# Patient Record
Sex: FEMALE | Race: WHITE | Employment: OTHER | URBAN - METROPOLITAN AREA
[De-identification: names, ages, dates, MRNs, and addresses within clinical notes are randomized per-mention and may not be internally consistent; named-entity substitution may affect disease eponyms.]

---

## 2017-06-02 ENCOUNTER — HOSPITAL ENCOUNTER (OUTPATIENT)
Dept: MAMMOGRAPHY | Facility: HOSPITAL | Age: 77
Discharge: HOME | End: 2017-06-02
Attending: OBSTETRICS & GYNECOLOGY
Payer: MEDICARE

## 2017-06-02 DIAGNOSIS — Z12.31 ENCOUNTER FOR SCREENING MAMMOGRAM FOR MALIGNANT NEOPLASM OF BREAST: ICD-10-CM

## 2017-06-02 PROCEDURE — 77067 SCR MAMMO BI INCL CAD: CPT | Performed by: OBSTETRICS & GYNECOLOGY

## 2017-07-14 ENCOUNTER — LAB ENCOUNTER (OUTPATIENT)
Dept: LAB | Facility: HOSPITAL | Age: 77
End: 2017-07-14
Attending: INTERNAL MEDICINE
Payer: MEDICARE

## 2017-07-14 DIAGNOSIS — E55.9 VITAMIN D DEFICIENCY, UNSPECIFIED: ICD-10-CM

## 2017-07-14 DIAGNOSIS — E78.00 PURE HYPERCHOLESTEROLEMIA, UNSPECIFIED: Primary | ICD-10-CM

## 2017-07-14 DIAGNOSIS — R73.01 IMPAIRED FASTING GLUCOSE: ICD-10-CM

## 2017-07-14 LAB
25(OH)D3 SERPL-MCNC: 40.2 NG/ML
ALBUMIN SERPL BCP-MCNC: 4.2 G/DL (ref 3.5–4.8)
ALBUMIN/GLOB SERPL: 1.6 {RATIO} (ref 1–2)
ALP SERPL-CCNC: 77 U/L (ref 32–100)
ALT SERPL-CCNC: 18 U/L (ref 14–54)
ANION GAP SERPL CALC-SCNC: 8 MMOL/L (ref 0–18)
AST SERPL-CCNC: 18 U/L (ref 15–41)
BASOPHILS # BLD: 0 K/UL (ref 0–0.2)
BASOPHILS NFR BLD: 0 %
BILIRUB SERPL-MCNC: 1.8 MG/DL (ref 0.3–1.2)
BILIRUB UR QL: NEGATIVE
BUN SERPL-MCNC: 10 MG/DL (ref 8–20)
BUN/CREAT SERPL: 16.4 (ref 10–20)
CALCIUM SERPL-MCNC: 9.6 MG/DL (ref 8.5–10.5)
CHLORIDE SERPL-SCNC: 103 MMOL/L (ref 95–110)
CHOLEST SERPL-MCNC: 228 MG/DL (ref 110–200)
CO2 SERPL-SCNC: 28 MMOL/L (ref 22–32)
COLOR UR: YELLOW
CREAT SERPL-MCNC: 0.61 MG/DL (ref 0.5–1.5)
EOSINOPHIL # BLD: 0.1 K/UL (ref 0–0.7)
EOSINOPHIL NFR BLD: 1 %
ERYTHROCYTE [DISTWIDTH] IN BLOOD BY AUTOMATED COUNT: 14.9 % (ref 11–15)
GLOBULIN PLAS-MCNC: 2.7 G/DL (ref 2.5–3.7)
GLUCOSE SERPL-MCNC: 106 MG/DL (ref 70–99)
GLUCOSE UR-MCNC: NEGATIVE MG/DL
HBA1C MFR BLD: 5.7 % (ref 4–6)
HCT VFR BLD AUTO: 44.4 % (ref 35–48)
HDLC SERPL-MCNC: 56 MG/DL
HGB BLD-MCNC: 14.7 G/DL (ref 12–16)
HGB UR QL STRIP.AUTO: NEGATIVE
LDLC SERPL CALC-MCNC: 153 MG/DL (ref 0–99)
LYMPHOCYTES # BLD: 1.9 K/UL (ref 1–4)
LYMPHOCYTES NFR BLD: 32 %
MCH RBC QN AUTO: 29.5 PG (ref 27–32)
MCHC RBC AUTO-ENTMCNC: 33.2 G/DL (ref 32–37)
MCV RBC AUTO: 88.7 FL (ref 80–100)
MONOCYTES # BLD: 0.4 K/UL (ref 0–1)
MONOCYTES NFR BLD: 6 %
NEUTROPHILS # BLD AUTO: 3.6 K/UL (ref 1.8–7.7)
NEUTROPHILS NFR BLD: 60 %
NITRITE UR QL STRIP.AUTO: NEGATIVE
NONHDLC SERPL-MCNC: 172 MG/DL
OSMOLALITY UR CALC.SUM OF ELEC: 287 MOSM/KG (ref 275–295)
PH UR: 5 [PH] (ref 5–8)
PLATELET # BLD AUTO: 283 K/UL (ref 140–400)
PMV BLD AUTO: 8 FL (ref 7.4–10.3)
POTASSIUM SERPL-SCNC: 4 MMOL/L (ref 3.3–5.1)
PROT SERPL-MCNC: 6.9 G/DL (ref 5.9–8.4)
PROT UR-MCNC: NEGATIVE MG/DL
RBC # BLD AUTO: 5 M/UL (ref 3.7–5.4)
RBC #/AREA URNS AUTO: 1 /HPF
SODIUM SERPL-SCNC: 139 MMOL/L (ref 136–144)
SP GR UR STRIP: 1.01 (ref 1–1.03)
TRIGL SERPL-MCNC: 93 MG/DL (ref 1–149)
TSH SERPL-ACNC: 2.29 UIU/ML (ref 0.45–5.33)
UROBILINOGEN UR STRIP-ACNC: <2
VIT C UR-MCNC: NEGATIVE MG/DL
WBC # BLD AUTO: 5.9 K/UL (ref 4–11)
WBC #/AREA URNS AUTO: 37 /HPF

## 2017-07-14 PROCEDURE — 81001 URINALYSIS AUTO W/SCOPE: CPT

## 2017-07-14 PROCEDURE — 36415 COLL VENOUS BLD VENIPUNCTURE: CPT

## 2017-07-14 PROCEDURE — 84443 ASSAY THYROID STIM HORMONE: CPT

## 2017-07-14 PROCEDURE — 85025 COMPLETE CBC W/AUTO DIFF WBC: CPT

## 2017-07-14 PROCEDURE — 82306 VITAMIN D 25 HYDROXY: CPT

## 2017-07-14 PROCEDURE — 80053 COMPREHEN METABOLIC PANEL: CPT

## 2017-07-14 PROCEDURE — 83036 HEMOGLOBIN GLYCOSYLATED A1C: CPT

## 2017-07-14 PROCEDURE — 80061 LIPID PANEL: CPT

## 2017-07-20 ENCOUNTER — LAB REQUISITION (OUTPATIENT)
Dept: LAB | Facility: HOSPITAL | Age: 77
End: 2017-07-20
Payer: MEDICARE

## 2017-07-20 DIAGNOSIS — N39.0 URINARY TRACT INFECTION: ICD-10-CM

## 2017-07-20 PROCEDURE — 87086 URINE CULTURE/COLONY COUNT: CPT | Performed by: INTERNAL MEDICINE

## 2017-08-30 PROBLEM — R13.19 ESOPHAGEAL DYSPHAGIA: Status: ACTIVE | Noted: 2017-08-30

## 2017-08-30 PROBLEM — Z86.010 HISTORY OF COLON POLYPS: Status: ACTIVE | Noted: 2017-08-30

## 2017-08-30 PROBLEM — K57.30 DIVERTICULOSIS OF LARGE INTESTINE WITHOUT HEMORRHAGE: Status: ACTIVE | Noted: 2017-08-30

## 2017-09-14 ENCOUNTER — HOSPITAL ENCOUNTER (OUTPATIENT)
Dept: BONE DENSITY | Facility: HOSPITAL | Age: 77
Discharge: HOME | End: 2017-09-14
Attending: INTERNAL MEDICINE
Payer: MEDICARE

## 2017-09-14 DIAGNOSIS — M40.209 KYPHOSIS: ICD-10-CM

## 2017-09-14 PROCEDURE — 77080 DXA BONE DENSITY AXIAL: CPT | Performed by: INTERNAL MEDICINE

## 2017-10-03 ENCOUNTER — APPOINTMENT (OUTPATIENT)
Dept: LAB | Facility: HOSPITAL | Age: 77
End: 2017-10-03
Attending: Other
Payer: MEDICARE

## 2017-10-03 ENCOUNTER — OFFICE VISIT (OUTPATIENT)
Dept: NEUROLOGY | Facility: CLINIC | Age: 77
End: 2017-10-03

## 2017-10-03 ENCOUNTER — TELEPHONE (OUTPATIENT)
Dept: NEUROLOGY | Facility: CLINIC | Age: 77
End: 2017-10-03

## 2017-10-03 VITALS
BODY MASS INDEX: 39.81 KG/M2 | WEIGHT: 172 LBS | DIASTOLIC BLOOD PRESSURE: 74 MMHG | RESPIRATION RATE: 16 BRPM | HEIGHT: 55 IN | HEART RATE: 80 BPM | SYSTOLIC BLOOD PRESSURE: 140 MMHG

## 2017-10-03 DIAGNOSIS — R53.1 WEAKNESS: ICD-10-CM

## 2017-10-03 DIAGNOSIS — G62.9 POLYNEUROPATHY: ICD-10-CM

## 2017-10-03 DIAGNOSIS — R47.1 DYSARTHRIA: ICD-10-CM

## 2017-10-03 DIAGNOSIS — R47.1 DYSARTHRIA: Primary | ICD-10-CM

## 2017-10-03 PROCEDURE — 82607 VITAMIN B-12: CPT | Performed by: OTHER

## 2017-10-03 PROCEDURE — 99204 OFFICE O/P NEW MOD 45 MIN: CPT | Performed by: OTHER

## 2017-10-03 PROCEDURE — 86780 TREPONEMA PALLIDUM: CPT

## 2017-10-03 PROCEDURE — 83519 RIA NONANTIBODY: CPT

## 2017-10-03 PROCEDURE — 84238 ASSAY NONENDOCRINE RECEPTOR: CPT

## 2017-10-03 PROCEDURE — 82550 ASSAY OF CK (CPK): CPT

## 2017-10-03 PROCEDURE — 86618 LYME DISEASE ANTIBODY: CPT | Performed by: OTHER

## 2017-10-03 PROCEDURE — 82746 ASSAY OF FOLIC ACID SERUM: CPT | Performed by: OTHER

## 2017-10-03 PROCEDURE — 36415 COLL VENOUS BLD VENIPUNCTURE: CPT

## 2017-10-03 PROCEDURE — 85652 RBC SED RATE AUTOMATED: CPT | Performed by: OTHER

## 2017-10-03 RX ORDER — ATORVASTATIN CALCIUM 10 MG/1
1 TABLET, FILM COATED ORAL DAILY
COMMUNITY
Start: 2017-07-19

## 2017-10-03 NOTE — TELEPHONE ENCOUNTER
-[facility] - As part of our Prior Authorization Reduction program, this [employer] member's plan does not currently require a prior authorization to receive these services. Case # [id number]. Will call Pt. to inform. Pt.  Informed

## 2017-10-03 NOTE — PROGRESS NOTES
Neurology Outpatient Consult Note    Dex Barnes : 1940   Referring Physician: Dr. Emmett Bingham  HPI:     Dex Barnes is a 67year old female who is being seen in neurologic evaluation.     Patient is being seen in evaluation for speech rm eyes and her jaw. At times, her jaw feels that she has been chewing a lot of gum, and her eyes feel like they are closing particularly towards the end of the day.     She mentions that her mother had Alzheimer's disease, her granddaughter has migraines, an Smokeless tobacco: Never Used                        Alcohol use: No            Other Topics            Concern  Caffeine Concern        No  Exercise                No          ROS:   GENERAL: no fevers, no chills mild/early superimposed acute left maxillary sinus infection. 4. Nasal mucosal thickening/swelling bilaterally consistent with chronic sinusitis/allergic rhinitis      Carotid Dopplers 3/29/2016  CONCLUSION:  1.  There is minimal atheromatous plaque in the

## 2017-10-06 ENCOUNTER — HOSPITAL ENCOUNTER (OUTPATIENT)
Dept: MRI IMAGING | Facility: HOSPITAL | Age: 77
Discharge: HOME | End: 2017-10-06
Attending: Other
Payer: MEDICARE

## 2017-10-06 DIAGNOSIS — G62.9 POLYNEUROPATHY: ICD-10-CM

## 2017-10-06 DIAGNOSIS — R47.1 DYSARTHRIA: ICD-10-CM

## 2017-10-06 DIAGNOSIS — R53.1 WEAKNESS: ICD-10-CM

## 2017-10-06 PROCEDURE — 70551 MRI BRAIN STEM W/O DYE: CPT | Performed by: OTHER

## 2017-10-09 ENCOUNTER — HOSPITAL (OUTPATIENT)
Dept: OTHER | Age: 77
End: 2017-10-09
Attending: INTERNAL MEDICINE

## 2017-10-12 ENCOUNTER — LAB ENCOUNTER (OUTPATIENT)
Dept: LAB | Facility: HOSPITAL | Age: 77
End: 2017-10-12
Attending: DERMATOLOGY
Payer: MEDICARE

## 2017-10-12 DIAGNOSIS — L21.9 SEBORRHEA: ICD-10-CM

## 2017-10-12 DIAGNOSIS — L30.9 ACUTE DERMATITIS: Primary | ICD-10-CM

## 2017-10-12 PROCEDURE — 36415 COLL VENOUS BLD VENIPUNCTURE: CPT

## 2017-10-12 PROCEDURE — 80053 COMPREHEN METABOLIC PANEL: CPT

## 2017-10-12 PROCEDURE — 85025 COMPLETE CBC W/AUTO DIFF WBC: CPT

## 2017-10-12 PROCEDURE — 86235 NUCLEAR ANTIGEN ANTIBODY: CPT

## 2017-10-12 PROCEDURE — 86038 ANTINUCLEAR ANTIBODIES: CPT

## 2017-10-13 ENCOUNTER — TELEPHONE (OUTPATIENT)
Dept: NEUROLOGY | Facility: CLINIC | Age: 77
End: 2017-10-13

## 2017-10-14 ENCOUNTER — PATIENT MESSAGE (OUTPATIENT)
Dept: NEUROLOGY | Facility: CLINIC | Age: 77
End: 2017-10-14

## 2017-10-16 ENCOUNTER — OFFICE VISIT (OUTPATIENT)
Dept: NEUROLOGY | Facility: CLINIC | Age: 77
End: 2017-10-16

## 2017-10-16 ENCOUNTER — TELEPHONE (OUTPATIENT)
Dept: NEUROLOGY | Facility: CLINIC | Age: 77
End: 2017-10-16

## 2017-10-16 VITALS
BODY MASS INDEX: 39 KG/M2 | DIASTOLIC BLOOD PRESSURE: 72 MMHG | SYSTOLIC BLOOD PRESSURE: 138 MMHG | WEIGHT: 167 LBS | RESPIRATION RATE: 13 BRPM | HEART RATE: 80 BPM

## 2017-10-16 DIAGNOSIS — G89.29 CHRONIC LEFT-SIDED LOW BACK PAIN WITH LEFT-SIDED SCIATICA: ICD-10-CM

## 2017-10-16 DIAGNOSIS — M48.061 LUMBAR FORAMINAL STENOSIS: ICD-10-CM

## 2017-10-16 DIAGNOSIS — G60.9 IDIOPATHIC PERIPHERAL NEUROPATHY: ICD-10-CM

## 2017-10-16 DIAGNOSIS — M54.16 LUMBAR RADICULOPATHY: ICD-10-CM

## 2017-10-16 DIAGNOSIS — M43.16 SPONDYLOLISTHESIS OF LUMBAR REGION: ICD-10-CM

## 2017-10-16 DIAGNOSIS — M51.9 LUMBAR DISC DISEASE: ICD-10-CM

## 2017-10-16 DIAGNOSIS — G89.29 CHRONIC LEFT-SIDED THORACIC BACK PAIN: Primary | ICD-10-CM

## 2017-10-16 DIAGNOSIS — M41.125 ADOLESCENT IDIOPATHIC SCOLIOSIS OF THORACOLUMBAR REGION: ICD-10-CM

## 2017-10-16 DIAGNOSIS — M48.062 SPINAL STENOSIS OF LUMBAR REGION WITH NEUROGENIC CLAUDICATION: ICD-10-CM

## 2017-10-16 DIAGNOSIS — M54.6 CHRONIC LEFT-SIDED THORACIC BACK PAIN: Primary | ICD-10-CM

## 2017-10-16 DIAGNOSIS — M54.42 CHRONIC LEFT-SIDED LOW BACK PAIN WITH LEFT-SIDED SCIATICA: ICD-10-CM

## 2017-10-16 PROBLEM — E66.01 SEVERE OBESITY (BMI 35.0-39.9) WITH COMORBIDITY (HCC): Chronic | Status: ACTIVE | Noted: 2017-10-16

## 2017-10-16 PROCEDURE — 99205 OFFICE O/P NEW HI 60 MIN: CPT | Performed by: PHYSICAL MEDICINE & REHABILITATION

## 2017-10-16 RX ORDER — ERGOCALCIFEROL 1.25 MG/1
50000 CAPSULE ORAL
COMMUNITY
End: 2018-04-24

## 2017-10-16 RX ORDER — MULTIVITAMIN
1 TABLET ORAL AS NEEDED
COMMUNITY

## 2017-10-16 NOTE — PROGRESS NOTES
Low Back Pain H & P    Chief Complaint:  Patient presents with:  Back Pain: new right handed patient here with childhood hx of thoracic back and lower back pain and spasms with numbness and tingling in the left lower extremities.  pt also has left side rib thigh.  She has bilateral constant calf pain. · The pain at its best is 1/10. The pain at its worst is 9/10. The pain is currently  3/10. The back pain is described as a(n) tightness sensation. The foot pain is a burning pain.   · The pain is worse when Male      pancreatic ca twins   • Breast Cancer Other 28     niece    • Breast Cancer Maternal Aunt 39   • Cancer Maternal Uncle 71     pancreatic   • Cancer Maternal Cousin Male      pancreatic ca twins       Social History     Social History  Social Hist stated age in no distress. The patient is well groomed. Psychiatric:  The patient is alert and oriented x 3. The patient has a normal affect and mood. Respiratory:  No acute respiratory distress. Patient does not have a cough.     HEENT:  Extraocu LEFT hip ROM normal   RIGHT hip flexion Negative pain   LEFT hip flexion Negative pain   RIGHT hip PHILLIP test Positive for right lateral hip pain   LEFT hip PHILLIP test Negative for pain   RIGHT hip internal rotation Negative for pain   LEFT hip internal future. She will follow up in 2 months but will call after she has done 4 weeks of the PT. The total office visit face-to-face visit time was at least 60 minutes with over half of the time spent discussing patient care and treatment.     The patient u

## 2017-10-16 NOTE — TELEPHONE ENCOUNTER
Called [facility] for authorization of approval of left L5 TFESI cpt codes [id number], [id number]. Talked to Alejo Soria. who states no authorization is required. Reference # [id number] Will  inform Nursing.

## 2017-10-16 NOTE — PATIENT INSTRUCTIONS
As of October 6th 2014, the Drug Enforcement Agency [facility]) is reclassifying all hydrocodone combination medications from Schedule III to Schedule II. This includes medications such as Norco, Vicodin, Lortab, Zohydro, and Vicoprofen.     What this means for y chart.      Plan  She will start PT for her scoliosis. If the PT is not helping her or the pain is limiting her participation, then I will do a left L5 TFESI. The patient does not need any pain medications at this time.     I reviewed her EMG/NCS repo

## 2017-10-16 NOTE — TELEPHONE ENCOUNTER
Spoke to patient who states she will do physical therapy first and call back when ready to schedule injection.

## 2017-10-17 ENCOUNTER — MED REC SCAN ONLY (OUTPATIENT)
Dept: NEUROLOGY | Facility: CLINIC | Age: 77
End: 2017-10-17

## 2017-10-18 ENCOUNTER — OFFICE VISIT (OUTPATIENT)
Dept: PHYSICAL THERAPY | Age: 77
End: 2017-10-18
Attending: PHYSICAL MEDICINE & REHABILITATION
Payer: MEDICARE

## 2017-10-18 DIAGNOSIS — M54.16 LUMBAR RADICULOPATHY: ICD-10-CM

## 2017-10-18 DIAGNOSIS — M41.125 ADOLESCENT IDIOPATHIC SCOLIOSIS OF THORACOLUMBAR REGION: ICD-10-CM

## 2017-10-18 DIAGNOSIS — M51.9 LUMBAR DISC DISEASE: ICD-10-CM

## 2017-10-18 DIAGNOSIS — M48.061 LUMBAR FORAMINAL STENOSIS: ICD-10-CM

## 2017-10-18 DIAGNOSIS — G89.29 CHRONIC LEFT-SIDED LOW BACK PAIN WITH LEFT-SIDED SCIATICA: ICD-10-CM

## 2017-10-18 DIAGNOSIS — M54.42 CHRONIC LEFT-SIDED LOW BACK PAIN WITH LEFT-SIDED SCIATICA: ICD-10-CM

## 2017-10-18 DIAGNOSIS — M48.062 SPINAL STENOSIS OF LUMBAR REGION WITH NEUROGENIC CLAUDICATION: ICD-10-CM

## 2017-10-18 DIAGNOSIS — M43.16 SPONDYLOLISTHESIS OF LUMBAR REGION: ICD-10-CM

## 2017-10-18 DIAGNOSIS — G89.29 CHRONIC LEFT-SIDED THORACIC BACK PAIN: ICD-10-CM

## 2017-10-18 DIAGNOSIS — M54.6 CHRONIC LEFT-SIDED THORACIC BACK PAIN: ICD-10-CM

## 2017-10-18 PROCEDURE — 97112 NEUROMUSCULAR REEDUCATION: CPT

## 2017-10-18 PROCEDURE — 97163 PT EVAL HIGH COMPLEX 45 MIN: CPT

## 2017-10-18 NOTE — PROGRESS NOTES
LUMBAR SPINE EVALUATION:   Referring Physician: Dr. Chandana Johnston  Diagnosis: Chronic left-sided thoracic back pain (M54.6,G89.29)  Chronic left-sided low back pain with left-sided sciatica (M54.42,G89.29)  Lumbar radiculopathy (M54.16)  Spinal stenosis of lumb Latrice Gracia for speech deficit that has resulted. No significant findings per imaging. Since falls, can no longer sleep in bed due to headaches; sleeps in chair.      Patient ambulates with straight cane at all times for community ambulation; occasionally uses rib hump, L lower rib hump, R iliac crest elevated vs L. Pt minimally laterally shifted, however moderately compressed axially. R shoulder more anteriorly rotated than L which may be secondary to hx of R total shoulder in addition to scoliotic posture.  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 Pt will demo proper gait mechanics with use of SC with improved upright posture, improved foot clearance, heel to toe gait pattern          Frequency / Duration: Patient will be seen for 2x/week or a total of 10 visits over a 90 day period.   Esdras Hernandez

## 2017-10-20 ENCOUNTER — OFFICE VISIT (OUTPATIENT)
Dept: PHYSICAL THERAPY | Age: 77
End: 2017-10-20
Attending: PHYSICAL MEDICINE & REHABILITATION
Payer: MEDICARE

## 2017-10-20 PROCEDURE — 97110 THERAPEUTIC EXERCISES: CPT

## 2017-10-20 PROCEDURE — 97116 GAIT TRAINING THERAPY: CPT

## 2017-10-20 PROCEDURE — 97112 NEUROMUSCULAR REEDUCATION: CPT

## 2017-10-20 NOTE — PROGRESS NOTES
Chronic left-sided thoracic back pain (M54.6,G89.29)  Chronic left-sided low back pain with left-sided sciatica (M54.42,G89.29)  Lumbar radiculopathy (M54.16)  Spinal stenosis of lumbar region with neurogenic claudication (N21.506)  Lumbar disc disease (M5 2015 which may be due to scar tissue adhesions. This may assist in pt's altered posture with RUE in anteriorly rotated and guarded position limiting lateral costal expansion on that side.     Patient Education: Gait mechanics, HEP progression, posture    Cu

## 2017-10-25 ENCOUNTER — OFFICE VISIT (OUTPATIENT)
Dept: PHYSICAL THERAPY | Age: 77
End: 2017-10-25
Attending: PHYSICAL MEDICINE & REHABILITATION
Payer: MEDICARE

## 2017-10-25 DIAGNOSIS — R19.7 DIARRHEA: Primary | ICD-10-CM

## 2017-10-25 PROCEDURE — 97112 NEUROMUSCULAR REEDUCATION: CPT

## 2017-10-25 PROCEDURE — 97110 THERAPEUTIC EXERCISES: CPT

## 2017-10-25 NOTE — PROGRESS NOTES
Chronic left-sided thoracic back pain (M54.6,G89.29)  Chronic left-sided low back pain with left-sided sciatica (M54.42,G89.29)  Lumbar radiculopathy (M54.16)  Spinal stenosis of lumbar region with neurogenic claudication (E57.779)  Lumbar disc disease (M5 -On floor and treadmill -Gait training with new cane         Assessment: Continued with posture and breathing training. Pt with improved ability to correct, however continued cueing required for shoulder positioning.  Added cane AAROM for RUE to promote imp

## 2017-10-26 ENCOUNTER — APPOINTMENT (OUTPATIENT)
Dept: LAB | Facility: HOSPITAL | Age: 77
End: 2017-10-26
Attending: INTERNAL MEDICINE
Payer: MEDICARE

## 2017-10-26 PROCEDURE — 87507 IADNA-DNA/RNA PROBE TQ 12-25: CPT

## 2017-11-01 ENCOUNTER — OFFICE VISIT (OUTPATIENT)
Dept: PHYSICAL THERAPY | Age: 77
End: 2017-11-01
Attending: PHYSICAL MEDICINE & REHABILITATION
Payer: MEDICARE

## 2017-11-01 PROCEDURE — 97140 MANUAL THERAPY 1/> REGIONS: CPT

## 2017-11-01 PROCEDURE — 97112 NEUROMUSCULAR REEDUCATION: CPT

## 2017-11-01 PROCEDURE — 97110 THERAPEUTIC EXERCISES: CPT

## 2017-11-01 NOTE — PROGRESS NOTES
Chronic left-sided thoracic back pain (M54.6,G89.29)  Chronic left-sided low back pain with left-sided sciatica (M54.42,G89.29)  Lumbar radiculopathy (M54.16)  Spinal stenosis of lumbar region with neurogenic claudication (M69.026)  Lumbar disc disease (M5 theresa    + heel raises -Seated positioning with lumbar roll  -Seated elongation with scapular retraction    +diaphragmatic breathing    +mirror cueing    Gait -Gait training with SC and gait belt     -Heel to toe pattern with adequate foot clearance and pelvic stability to at least 4/5 to increase tolerance for prolonged ambulation and prolonged sitting  4. Pt will demo proper mechanics for bending/squatting and lifting to decrease stress through spine with daily activity  5.  Pt will demo proper gait

## 2017-11-03 ENCOUNTER — OFFICE VISIT (OUTPATIENT)
Dept: PHYSICAL THERAPY | Age: 77
End: 2017-11-03
Attending: PHYSICAL MEDICINE & REHABILITATION
Payer: MEDICARE

## 2017-11-03 PROCEDURE — 97140 MANUAL THERAPY 1/> REGIONS: CPT

## 2017-11-03 NOTE — PROGRESS NOTES
Chronic left-sided thoracic back pain (M54.6,G89.29)  Chronic left-sided low back pain with left-sided sciatica (M54.42,G89.29)  Lumbar radiculopathy (M54.16)  Spinal stenosis of lumbar region with neurogenic claudication (L76.384)  Lumbar disc disease (M5 retraction    +scap retract with RAZ    +TA marches    + heel raises -Seated positioning with lumbar roll  -Seated elongation with scapular retraction    +diaphragmatic breathing    +mirror cueing      Gait -Gait training with SC and gait belt     -Heel to possible 3D alignment to decrease severity of scoliotic curvature  3. Pt will demo improved core and pelvic stability to at least 4/5 to increase tolerance for prolonged ambulation and prolonged sitting  4.  Pt will demo proper mechanics for bending/squatti

## 2017-11-08 ENCOUNTER — APPOINTMENT (OUTPATIENT)
Dept: PHYSICAL THERAPY | Age: 77
End: 2017-11-08
Attending: PHYSICAL MEDICINE & REHABILITATION
Payer: MEDICARE

## 2017-11-10 ENCOUNTER — APPOINTMENT (OUTPATIENT)
Dept: PHYSICAL THERAPY | Age: 77
End: 2017-11-10
Attending: PHYSICAL MEDICINE & REHABILITATION
Payer: MEDICARE

## 2017-11-15 ENCOUNTER — OFFICE VISIT (OUTPATIENT)
Dept: PHYSICAL THERAPY | Age: 77
End: 2017-11-15
Attending: PHYSICAL MEDICINE & REHABILITATION
Payer: MEDICARE

## 2017-11-15 PROCEDURE — 97116 GAIT TRAINING THERAPY: CPT

## 2017-11-15 PROCEDURE — 97112 NEUROMUSCULAR REEDUCATION: CPT

## 2017-11-15 PROCEDURE — 97110 THERAPEUTIC EXERCISES: CPT

## 2017-11-15 NOTE — PROGRESS NOTES
Patient Name: Otis Palmer, : 1940, MRN: [id number]   Date:  11/15/2017  Referring Physician:  Luz Romo    Diagnosis: Chronic left-sided thoracic back pain (M54.6,G89.29)  Chronic left-sided low back pain with left-sided sciatica (M54.42, compliant with her HEP and is motivated to continue progressing. Recommend continued PT to progress postural correction in standing and with functional mobility, and address balance and stability deficits.     Objective:     Observation/Posture: fwd head, r improved foot clearance, heel to toe gait pattern - improved          Current status G Code: Progress/Goal status, Mobility: Walking and Moving Around, CK: 40-59% impaired, limited, or restricted  Goal status G Code:  Mobility: Walking and Moving AroundCJ:

## 2017-11-16 ENCOUNTER — TELEPHONE (OUTPATIENT)
Dept: NEUROLOGY | Facility: CLINIC | Age: 77
End: 2017-11-16

## 2017-11-16 ENCOUNTER — OFFICE VISIT (OUTPATIENT)
Dept: NEUROLOGY | Facility: CLINIC | Age: 77
End: 2017-11-16

## 2017-11-16 VITALS
HEART RATE: 80 BPM | WEIGHT: 163.63 LBS | HEIGHT: 55 IN | BODY MASS INDEX: 37.87 KG/M2 | SYSTOLIC BLOOD PRESSURE: 118 MMHG | RESPIRATION RATE: 16 BRPM | DIASTOLIC BLOOD PRESSURE: 64 MMHG

## 2017-11-16 DIAGNOSIS — M43.16 SPONDYLOLISTHESIS OF LUMBAR REGION: ICD-10-CM

## 2017-11-16 DIAGNOSIS — M48.062 SPINAL STENOSIS OF LUMBAR REGION WITH NEUROGENIC CLAUDICATION: ICD-10-CM

## 2017-11-16 DIAGNOSIS — M54.6 CHRONIC LEFT-SIDED THORACIC BACK PAIN: ICD-10-CM

## 2017-11-16 DIAGNOSIS — M48.061 LUMBAR FORAMINAL STENOSIS: Primary | ICD-10-CM

## 2017-11-16 DIAGNOSIS — G89.29 CHRONIC LEFT-SIDED THORACIC BACK PAIN: ICD-10-CM

## 2017-11-16 DIAGNOSIS — M51.9 LUMBAR DISC DISEASE: ICD-10-CM

## 2017-11-16 DIAGNOSIS — M54.42 CHRONIC LEFT-SIDED LOW BACK PAIN WITH LEFT-SIDED SCIATICA: ICD-10-CM

## 2017-11-16 DIAGNOSIS — G89.29 CHRONIC LEFT-SIDED LOW BACK PAIN WITH LEFT-SIDED SCIATICA: ICD-10-CM

## 2017-11-16 PROCEDURE — 99213 OFFICE O/P EST LOW 20 MIN: CPT | Performed by: NURSE PRACTITIONER

## 2017-11-16 NOTE — TELEPHONE ENCOUNTER
Called patient to advise insurance was verified and PT is a covered benefit and does not require authorization for initial evaluation,  answered and HIPAA verified, stated that he would tell her as soon as she returns home

## 2017-11-16 NOTE — PROGRESS NOTES
HPI:   Sara Richardson is a 65year old female. Patient presents with:  Back Pain: LOV:10/16/2017 Patient is here for a follow up on her back pain, stated she has scoliosis.  Stated she has been doing physical therapy for her scoliosis and that it has bee reflux    • Essential hypertension    • Foot fracture     RIGHT   • Head concussion 05/06/2014   • Head concussion 2009   • History of hiatal hernia    • Hyperlipidemia    • Neuropathy     PERIPHERAL   • Scoliosis     SEVERE     Past Surgical History:  200 EXTRA STRENGTH) 500 MG Oral Tab Take 1,000 mg by mouth as needed.    Disp:  Rfl:        Allergies:     Biaxin [Clarithromy*        Comment:Stomach pain  Aspirin                 Bradyarrhythmia  Contrast  [Radiolog*      Demerol Hcl [Meperi*    Hallucination leg raise-LEFT Negative for pain     Vascular lower extremity:   Dorsalis pedis pulse-RIGHT 2+   Dorsalis pedis pulse-LEFT 2+   Tibialis posterior pulse-RIGHT 2+   Tibialis posterior pulse-LEFT 2+     Neurological Lower Extremity:    Light Touch Sensation:

## 2017-11-16 NOTE — TELEPHONE ENCOUNTER
From: Trey Kayser, MD  To: Claudene Rei  Sent: 10/14/2017 12:14 PM CDT  Subject: Blood work, MRI results–nothing to worry about    Dear Chevy Salazar,    I wanted to review the results of your testing with you.  The blood tests from 10/3/2017 were normal. The M

## 2017-11-17 ENCOUNTER — TELEPHONE (OUTPATIENT)
Dept: NEUROLOGY | Facility: CLINIC | Age: 77
End: 2017-11-17

## 2017-11-17 DIAGNOSIS — R47.1 DYSARTHRIA: Primary | ICD-10-CM

## 2017-11-17 NOTE — TELEPHONE ENCOUNTER
Called patient to advise insurance was verified and Speech Therapy is a covered benefit and does not require authorization

## 2017-11-21 ENCOUNTER — OFFICE VISIT (OUTPATIENT)
Dept: PHYSICAL THERAPY | Age: 77
End: 2017-11-21
Attending: NURSE PRACTITIONER
Payer: MEDICARE

## 2017-11-21 PROCEDURE — 97110 THERAPEUTIC EXERCISES: CPT

## 2017-11-21 PROCEDURE — 97140 MANUAL THERAPY 1/> REGIONS: CPT

## 2017-11-21 NOTE — PROGRESS NOTES
Chronic left-sided thoracic back pain (M54.6,G89.29)  Chronic left-sided low back pain with left-sided sciatica (M54.42,G89.29)  Lumbar radiculopathy (M54.16)  Spinal stenosis of lumbar region with neurogenic claudication (I37.185)  Lumbar disc disease (M5 add  -Standing postural correction with mirror cue    +TA    +scapular retractions -Seated positioning with passive corrections    +Diaphragmatic breathing    +Lateral costal expansion    +scapular retractions and with RAZ    + TA    +TA + hip add    +Seat Mechanics, cane AAROM shoulder flexion and abduction, slump sitting self mobilization    Plan: Re-assess posture and gait mechanics. Initiate balance training once pt able to properly maintain upright posture in standing.     Goals     • Therapy Goals

## 2017-11-28 ENCOUNTER — OFFICE VISIT (OUTPATIENT)
Dept: PHYSICAL THERAPY | Age: 77
End: 2017-11-28
Attending: NURSE PRACTITIONER
Payer: MEDICARE

## 2017-11-28 PROCEDURE — 97110 THERAPEUTIC EXERCISES: CPT

## 2017-11-28 NOTE — PROGRESS NOTES
Chronic left-sided thoracic back pain (M54.6,G89.29)  Chronic left-sided low back pain with left-sided sciatica (M54.42,G89.29)  Lumbar radiculopathy (M54.16)  Spinal stenosis of lumbar region with neurogenic claudication (X85.580)  Lumbar disc disease (M5 NuStep lvl 5 x7mins  Semi-Recumbent:  - BKFO for self 2x10 adductor stretch  - Hip add with ball 15x   - TA + heel slide 10x R/L  - Chest press with cane 10x  - Seated figure-4 stretch - stopped as pt unable  - Supine SKTC with towel assist      - And with Progressed HEP as indicated above.      Current HEP: seated correction with passive corrections and elongation, diaphragmatic breathing, lateral costal expansion, seated shoulder abduction for lateral costal mobility,  standing postural correction with mirr

## 2017-12-04 ENCOUNTER — OFFICE VISIT (OUTPATIENT)
Dept: SPEECH THERAPY | Facility: HOSPITAL | Age: 77
End: 2017-12-04
Attending: Other
Payer: MEDICARE

## 2017-12-04 ENCOUNTER — OFFICE VISIT (OUTPATIENT)
Dept: PHYSICAL THERAPY | Age: 77
End: 2017-12-04
Attending: PHYSICAL MEDICINE & REHABILITATION
Payer: MEDICARE

## 2017-12-04 DIAGNOSIS — R47.1 DYSARTHRIA: ICD-10-CM

## 2017-12-04 PROCEDURE — 97112 NEUROMUSCULAR REEDUCATION: CPT

## 2017-12-04 PROCEDURE — 92522 EVALUATE SPEECH PRODUCTION: CPT

## 2017-12-04 PROCEDURE — 97110 THERAPEUTIC EXERCISES: CPT

## 2017-12-04 NOTE — PROGRESS NOTES
ADULT SPEECH/LANGUAGE EVALUATION:   Referring Physician: Dr. Darek March  Diagnosis: Apraxia of speech, dysarthria Date of Service: 12/4/2017     PATIENT Nikhil Obrien is a 65year old y/o female who presents to therapy today with complaints of \"in intelligibility is 100%. Patient demonstrates difficulty with motor programming and sequencing of phonemes within words and sentences. This results in the patient's typically slow speaking rate.   Difficulties increase proportionate to the length and comp sentences to 85% accuracy, with self-correction. 3.  The patient will improve speech production of 2-3 conversational sentences on a topic to 85% accuracy.   4.  The patient will increase speaking rate from moderate to mild-moderate impairment as measured

## 2017-12-04 NOTE — PROGRESS NOTES
Chronic left-sided thoracic back pain (M54.6,G89.29)  Chronic left-sided low back pain with left-sided sciatica (M54.42,G89.29)  Lumbar radiculopathy (M54.16)  Spinal stenosis of lumbar region with neurogenic claudication (C94.266)  Lumbar disc disease (M5 NuStep lvl 5 x7mins  Semi-Recumbent:  - BKFO for self 2x10 adductor stretch  - Hip add with ball 15x   - TA + heel slide 10x R/L  - Chest press with cane 10x  - Seated figure-4 stretch - stopped as pt unable  - Supine SKTC with towel assist      - And with with seated correction, however to shift focus to standing.     Current HEP: seated correction with passive corrections and elongation, diaphragmatic breathing, lateral costal expansion, seated shoulder abduction for lateral costal mobility,  standing postu

## 2017-12-06 ENCOUNTER — OFFICE VISIT (OUTPATIENT)
Dept: PHYSICAL THERAPY | Age: 77
End: 2017-12-06
Attending: PHYSICAL MEDICINE & REHABILITATION
Payer: MEDICARE

## 2017-12-06 ENCOUNTER — OFFICE VISIT (OUTPATIENT)
Dept: SPEECH THERAPY | Facility: HOSPITAL | Age: 77
End: 2017-12-06
Attending: Other
Payer: MEDICARE

## 2017-12-06 PROCEDURE — 92507 TX SP LANG VOICE COMM INDIV: CPT

## 2017-12-06 PROCEDURE — 97110 THERAPEUTIC EXERCISES: CPT

## 2017-12-06 PROCEDURE — 97112 NEUROMUSCULAR REEDUCATION: CPT

## 2017-12-06 NOTE — PROGRESS NOTES
Chronic left-sided thoracic back pain (M54.6,G89.29)  Chronic left-sided low back pain with left-sided sciatica (M54.42,G89.29)  Lumbar radiculopathy (M54.16)  Spinal stenosis of lumbar region with neurogenic claudication (Q41.751)  Lumbar disc disease (M5 stretch  - TA + hip add 5x  - Hip add with ball 10x  - Chest press with cane 10x  - Shoulder punch 10x L  - Seated dural mobilization 10x R/L - NuStep lvl 5 x7mins  Semi-Recumbent:  - BKFO for self 2x10 adductor stretch  - Hip add with ball 15x   - TA + he correction with passive corrections and elongation, diaphragmatic breathing, lateral costal expansion, seated shoulder abduction for lateral costal mobility,  standing postural correction with mirror, seated postural correction with TA and hip add, instruc

## 2017-12-06 NOTE — PROGRESS NOTES
Dx: Apraxia of speech, dysarthria         Authorized # of Visits:  10         Next MD visit: February 2017  Fall Risk: standard         Precautions: n/a           Medication Changes since last visit?: No  Pain: 0/10  Subjective: 'I think it's a better day assess singing of songs given for homework. May give rhymes for homework next session.         Charges: 1, Speech Therapy       Total Timed Treatment: 42 min  Total Treatment Time: 42 min

## 2017-12-11 ENCOUNTER — APPOINTMENT (OUTPATIENT)
Dept: SPEECH THERAPY | Facility: HOSPITAL | Age: 77
End: 2017-12-11
Attending: Other
Payer: MEDICARE

## 2017-12-11 ENCOUNTER — OFFICE VISIT (OUTPATIENT)
Dept: PHYSICAL THERAPY | Age: 77
End: 2017-12-11
Attending: PHYSICAL MEDICINE & REHABILITATION
Payer: MEDICARE

## 2017-12-11 PROCEDURE — 97112 NEUROMUSCULAR REEDUCATION: CPT

## 2017-12-11 PROCEDURE — 97110 THERAPEUTIC EXERCISES: CPT

## 2017-12-11 NOTE — PROGRESS NOTES
Chronic left-sided thoracic back pain (M54.6,G89.29)  Chronic left-sided low back pain with left-sided sciatica (M54.42,G89.29)  Lumbar radiculopathy (M54.16)  Spinal stenosis of lumbar region with neurogenic claudication (W69.770)  Lumbar disc disease (M5 Seated sciatic nerve mobilization 10x R/L -Nustep lvl 4 x5mins Semi-Recumbent:  - Passive adductor stretch 10x R/L  - BKFO for self 10x R/L adductor stretch  - TA + hip add 5x  - Hip add with ball 10x  - Chest press with cane 10x  - Shoulder punch 10x L  - Further improvement in numbness with heel raises and focus on push off with ambulation. Patient Education: Reviewed current HEP. Instructed pt on performance of wall slides just with LUE. Progressed HEP as indicated above.     Current HEP: seated arabella increase speaking rate from moderate to mild-moderate impairment as measured by word per minute. Charges:  TherEx 1, Neuro Re-ed x2 Total Timed Treatment: 45 min  Total Treatment Time: 45 min

## 2017-12-13 ENCOUNTER — OFFICE VISIT (OUTPATIENT)
Dept: PHYSICAL THERAPY | Age: 77
End: 2017-12-13
Attending: PHYSICAL MEDICINE & REHABILITATION
Payer: MEDICARE

## 2017-12-13 ENCOUNTER — OFFICE VISIT (OUTPATIENT)
Dept: SPEECH THERAPY | Facility: HOSPITAL | Age: 77
End: 2017-12-13
Attending: Other
Payer: MEDICARE

## 2017-12-13 PROCEDURE — 97112 NEUROMUSCULAR REEDUCATION: CPT

## 2017-12-13 PROCEDURE — 97110 THERAPEUTIC EXERCISES: CPT

## 2017-12-13 PROCEDURE — 92507 TX SP LANG VOICE COMM INDIV: CPT

## 2017-12-13 NOTE — PROGRESS NOTES
Dx: Apraxia of speech, dysarthria         Authorized # of Visits:  10         Next MD visit: February 2017  Fall Risk: standard         Precautions: n/a           Medication Changes since last visit?: No  Pain: 0/10  Subjective: Patient reports practicing Next session will recheck 4-5 word phrases one more time for consistent achievement. Then progress to oral reading/phrasing of 7-8 word sents and then short paragraphs both reading and formulated.        Charges: 1, Speech Therapy       Total Timed Treatm

## 2017-12-13 NOTE — PROGRESS NOTES
Chronic left-sided thoracic back pain (M54.6,G89.29)  Chronic left-sided low back pain with left-sided sciatica (M54.42,G89.29)  Lumbar radiculopathy (M54.16)  Spinal stenosis of lumbar region with neurogenic claudication (M75.510)  Lumbar disc disease (M5 20x  - Shuttle (SL) leg press 3B on L, 4B on R 15x ea  - Standing heel raise 20x Semi-Recumbent:  - Passive adductor stretch 10x R/L  - BKFO for self 10x R/L adductor stretch  - TA + hip add 5x  - Hip add with ball 10x  - Chest press with cane 10x  - Shoul Progressed to incorporate shuttle for additional LE strengthening. Pt with difficulty L>R secondary to hip fatigue. Progressed dynamic balance with use of obstacle course.  Pt able to perform well with good stability and no LOB although tends to ascend/desc syllable words to 85% accuracy. 2.  The patient will improve articulation of 6-10 word sentences to 85% accuracy, with self-correction. 3.  The patient will improve speech production of 2-3 conversational sentences on a topic to 85% accuracy.   4.  The pa

## 2017-12-18 ENCOUNTER — OFFICE VISIT (OUTPATIENT)
Dept: SPEECH THERAPY | Facility: HOSPITAL | Age: 77
End: 2017-12-18
Attending: Other
Payer: MEDICARE

## 2017-12-18 PROCEDURE — 92507 TX SP LANG VOICE COMM INDIV: CPT

## 2017-12-18 NOTE — PROGRESS NOTES
Dx: Apraxia of speech, dysarthria         Authorized # of Visits:  10         Next MD visit: February 2017  Fall Risk: standard         Precautions: n/a           Medication Changes since last visit?: No  Pain: 0/10  Subjective: \"I think I'm getting brock sentences.       Charges: 1, Speech Therapy       Total Timed Treatment: 40 min  Total Treatment Time: 40 min

## 2017-12-19 ENCOUNTER — OFFICE VISIT (OUTPATIENT)
Dept: PHYSICAL THERAPY | Age: 77
End: 2017-12-19
Attending: PHYSICAL MEDICINE & REHABILITATION
Payer: MEDICARE

## 2017-12-19 PROCEDURE — 97110 THERAPEUTIC EXERCISES: CPT

## 2017-12-19 NOTE — PROGRESS NOTES
Chronic left-sided thoracic back pain (M54.6,G89.29)  Chronic left-sided low back pain with left-sided sciatica (M54.42,G89.29)  Lumbar radiculopathy (M54.16)  Spinal stenosis of lumbar region with neurogenic claudication (X97.182)  Lumbar disc disease (M5 Supine: L hip PROM  - Supine TA + hip add 10x  - Supine SKTC with towel assist 10x R/L  - Standing wall lumbar extensions 2x10  - Standing heel raises 2x10 BUE support at bar - NuStep lvl 5 x7mins  Semi-Recumbent:  - BKFO for self 2x10 adductor stretch  - remaining lumbar symptoms. Patient Education: Discussed posture and positioning. Advised pt on standing lumbar extensions to decrease effect of prolonged flexion in sitting when pt sleeps and by end of day when pt tends to increase fwd flexion.      Jaylin Abbasi production of 2-3 conversational sentences on a topic to 85% accuracy. 4.  The patient will increase speaking rate from moderate to mild-moderate impairment as measured by word per minute. Charges:  TherEx 3 Total Timed Treatment: 45 min  Total

## 2017-12-20 ENCOUNTER — OFFICE VISIT (OUTPATIENT)
Dept: SPEECH THERAPY | Facility: HOSPITAL | Age: 77
End: 2017-12-20
Attending: Other
Payer: MEDICARE

## 2017-12-20 PROCEDURE — 92507 TX SP LANG VOICE COMM INDIV: CPT

## 2017-12-20 NOTE — PROGRESS NOTES
Dx: Apraxia of speech, dysarthria         Authorized # of Visits:  10         Next MD visit: February 2017  Fall Risk: standard         Precautions: n/a           Medication Changes since last visit?: No  Pain: 0/10  Subjective:  Patent understanding bowen speaking rate from moderate to mild-moderate impairment as measured by word per minute. Plan: Continue OP speech tx x 5 more sessions. Next session will continue targeting word-finding description activities along with apraxic tasks.       Charges: 1, S

## 2017-12-22 ENCOUNTER — OFFICE VISIT (OUTPATIENT)
Dept: PHYSICAL THERAPY | Age: 77
End: 2017-12-22
Attending: PHYSICAL MEDICINE & REHABILITATION
Payer: MEDICARE

## 2017-12-22 PROCEDURE — 97110 THERAPEUTIC EXERCISES: CPT

## 2017-12-22 NOTE — PROGRESS NOTES
Patient Name: Dex Barnes, : 1940, MRN: [id number]   Date:  2017  Referring Physician:  Tasneem Romo    Diagnosis: Chronic left-sided thoracic back pain (M54.6,G89.29)  Chronic left-sided low back pain with left-sided sciatica (M54.42, with any future questions or concerns. Pt in agreement with plan.      Objective:     Observation/Posture: mild fwd head, rounded shoulders, R shoulder elevated vs L, R upper rib hump, L lower rib hump, R iliac crest elevated vs L     Gait: Pt ambulating wi for bending/squatting and lifting to decrease stress through spine with daily activity - MET  5. Pt will demo proper gait mechanics with use of SC with improved upright posture, improved foot clearance, heel to toe gait pattern - MET    ST:    1.   The ayanna

## 2018-01-10 ENCOUNTER — OFFICE VISIT (OUTPATIENT)
Dept: SPEECH THERAPY | Facility: HOSPITAL | Age: 78
End: 2018-01-10
Attending: Other
Payer: MEDICARE

## 2018-01-10 PROCEDURE — 92507 TX SP LANG VOICE COMM INDIV: CPT

## 2018-01-10 NOTE — PROGRESS NOTES
Dx: Apraxia of speech, dysarthria         Authorized # of Visits:  10         Next MD visit: February 2017  Fall Risk: standard         Precautions: n/a           Medication Changes since last visit?: No  Pain: 0/10  Subjective:  Patient reports that her c sentences to 85% accuracy, with self-correction. Goal progressing  3. The patient will improve speech production of 2-3 conversational sentences on a topic to 85% accuracy. Goal progressing.    4.  The patient will increase speaking rate from moderate to

## 2018-01-15 ENCOUNTER — APPOINTMENT (OUTPATIENT)
Dept: SPEECH THERAPY | Facility: HOSPITAL | Age: 78
End: 2018-01-15
Attending: Other
Payer: MEDICARE

## 2018-01-17 ENCOUNTER — OFFICE VISIT (OUTPATIENT)
Dept: SPEECH THERAPY | Facility: HOSPITAL | Age: 78
End: 2018-01-17
Attending: Other
Payer: MEDICARE

## 2018-01-17 PROCEDURE — 92507 TX SP LANG VOICE COMM INDIV: CPT

## 2018-01-17 NOTE — PROGRESS NOTES
Dx: Apraxia of speech, dysarthria         Authorized # of Visits:  10         Next MD visit: February 2017  Fall Risk: standard         Precautions: n/a           Medication Changes since last visit?: No  Pain: 0/10  Subjective:  Patient had head cold, cou progressing  3. The patient will improve speech production of 2-3 conversational sentences on a topic to 85% accuracy. Goal progressing.    4.  The patient will increase speaking rate from moderate to mild-moderate impairment as measured by word per minut

## 2018-01-22 ENCOUNTER — OFFICE VISIT (OUTPATIENT)
Dept: SPEECH THERAPY | Facility: HOSPITAL | Age: 78
End: 2018-01-22
Attending: Other
Payer: MEDICARE

## 2018-01-22 PROCEDURE — 92507 TX SP LANG VOICE COMM INDIV: CPT

## 2018-01-22 NOTE — PROGRESS NOTES
Dx: Apraxia of speech, dysarthria         Authorized # of Visits:  10         Next MD visit: February 2017  Fall Risk: standard         Precautions: n/a           Medication Changes since last visit?: No  Pain: 6/10 in tongue  Subjective:  Patient with sor resulted in small mispronunciations. Goals:   1. The patient will improve articulation of 4-5 syllable words to 85% accuracy. Goal Met  2. The patient will improve articulation of 6-10 word sentences to 85% accuracy, with self-correction.   Goal pro

## 2018-01-23 ENCOUNTER — OFFICE VISIT (OUTPATIENT)
Dept: OTOLARYNGOLOGY | Facility: CLINIC | Age: 78
End: 2018-01-23

## 2018-01-23 VITALS
DIASTOLIC BLOOD PRESSURE: 70 MMHG | BODY MASS INDEX: 37.87 KG/M2 | SYSTOLIC BLOOD PRESSURE: 130 MMHG | HEIGHT: 55 IN | WEIGHT: 163.63 LBS | TEMPERATURE: 98 F

## 2018-01-23 DIAGNOSIS — R49.0 HOARSENESS: ICD-10-CM

## 2018-01-23 DIAGNOSIS — K14.1 GEOGRAPHIC TONGUE: Primary | ICD-10-CM

## 2018-01-23 PROCEDURE — 99213 OFFICE O/P EST LOW 20 MIN: CPT | Performed by: OTOLARYNGOLOGY

## 2018-01-23 PROCEDURE — G0463 HOSPITAL OUTPT CLINIC VISIT: HCPCS | Performed by: OTOLARYNGOLOGY

## 2018-01-23 NOTE — PROGRESS NOTES
Rubens Mejia is a 65year old female. Patient presents with:  Mouth/Lip Problem: c/o sore tongue for a week, LOV 12/8/16  Nose Problem: c/o runny nose    HPI:   Her tongue has been very irritated and it is difficult for her to eat salty or spicy food.   H Normal.   Head/Face Normal Facial features - Normal. Eyebrows - Normal. Skull - Normal.   Oral/Oropharynx Normal Lips - Normal, Tonsils - Normal, Tongue -geographic tongue without any lesions seen indirect laryngoscopy normal   Nasal Normal External nose -

## 2018-01-24 ENCOUNTER — OFFICE VISIT (OUTPATIENT)
Dept: SPEECH THERAPY | Facility: HOSPITAL | Age: 78
End: 2018-01-24
Attending: Other
Payer: MEDICARE

## 2018-01-24 PROCEDURE — 92507 TX SP LANG VOICE COMM INDIV: CPT

## 2018-01-24 NOTE — PROGRESS NOTES
Dx: Apraxia of speech, dysarthria         Authorized # of Visits:  10         Next MD visit: February 2017  Fall Risk: standard         Precautions: n/a           Medication Changes since last visit?: No  Pain: 2/10 in tongue  Subjective:  Patient saw ENT narratives about each person and family. No cueing required by therapist.  Patient with some formulation hesitations and overall slow rate, but recovers independently now. Goals:   1.   The patient will improve articulation of 4-5 syllable words to 8

## 2018-01-29 ENCOUNTER — OFFICE VISIT (OUTPATIENT)
Dept: SPEECH THERAPY | Facility: HOSPITAL | Age: 78
End: 2018-01-29
Attending: INTERNAL MEDICINE
Payer: MEDICARE

## 2018-01-29 PROCEDURE — 92507 TX SP LANG VOICE COMM INDIV: CPT

## 2018-01-29 NOTE — PROGRESS NOTES
Patient Name: Aleksandr Dugan, : 1940, MRN: [id number]   Date:  2018  Referring Physician:  Olayinka Bruce    Diagnosis: Apraxia of speech, Anomia    Progress Summary    Pt has attended 10 visits in Speech Therapy.      Progress Note Start Date Speaking rate remains at 108wpm (olov=239) and seems to be a consistent necessary compensatory strategy in order to maximize the smoothest speech flow.       Current status G Code: Progress/Goal status, Motor Speech, CI: 1%-19% impaired, limited, or restri

## 2018-02-05 ENCOUNTER — OFFICE VISIT (OUTPATIENT)
Dept: SPEECH THERAPY | Facility: HOSPITAL | Age: 78
End: 2018-02-05
Attending: INTERNAL MEDICINE
Payer: MEDICARE

## 2018-02-05 PROCEDURE — 92507 TX SP LANG VOICE COMM INDIV: CPT

## 2018-02-13 ENCOUNTER — OFFICE VISIT (OUTPATIENT)
Dept: NEUROLOGY | Facility: CLINIC | Age: 78
End: 2018-02-13

## 2018-02-13 ENCOUNTER — HOSPITAL ENCOUNTER (OUTPATIENT)
Dept: GENERAL RADIOLOGY | Facility: HOSPITAL | Age: 78
Discharge: HOME | End: 2018-02-13
Attending: PHYSICAL MEDICINE & REHABILITATION
Payer: MEDICARE

## 2018-02-13 VITALS
WEIGHT: 166 LBS | BODY MASS INDEX: 38.42 KG/M2 | SYSTOLIC BLOOD PRESSURE: 130 MMHG | DIASTOLIC BLOOD PRESSURE: 84 MMHG | HEART RATE: 90 BPM | HEIGHT: 55 IN | RESPIRATION RATE: 16 BRPM

## 2018-02-13 DIAGNOSIS — M54.2 BILATERAL NECK PAIN: Primary | ICD-10-CM

## 2018-02-13 DIAGNOSIS — M54.2 BILATERAL NECK PAIN: ICD-10-CM

## 2018-02-13 DIAGNOSIS — G54.0 TOS (THORACIC OUTLET SYNDROME): ICD-10-CM

## 2018-02-13 DIAGNOSIS — G44.59 OTHER COMPLICATED HEADACHE SYNDROME: ICD-10-CM

## 2018-02-13 PROCEDURE — 72050 X-RAY EXAM NECK SPINE 4/5VWS: CPT | Performed by: PHYSICAL MEDICINE & REHABILITATION

## 2018-02-13 PROCEDURE — 99214 OFFICE O/P EST MOD 30 MIN: CPT | Performed by: PHYSICAL MEDICINE & REHABILITATION

## 2018-02-13 RX ORDER — MULTIVIT WITH MINERALS/LUTEIN
1000 TABLET ORAL AS NEEDED
COMMUNITY
End: 2019-05-01

## 2018-02-13 NOTE — PATIENT INSTRUCTIONS
As of October 6th 2014, the Drug Enforcement Agency [facility]) is reclassifying all hydrocodone combination medications from Schedule III to Schedule II. This includes medications such as Norco, Vicodin, Lortab, Zohydro, and Vicoprofen.     What this means for y chart.      Plan  She will get cervical spine x-rays. She will start PT on her cervical spine and thoracic outlet. The patient does not need any pain medications at this time.     She will follow up in 4-6 weeks with the nurse practitioner and with me

## 2018-02-13 NOTE — PROGRESS NOTES
Cervical Pain H & P    Chief Complaint:  Patient presents with:  Back Pain: LOV: 11/16/17. Patient is here for a f/u on back pain due to scoliosis. Pt completed PT which provided relief. Pt has tolerable back ache. Pain 3/10.    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 Patient does not have a cough. HEENT:  Extraocular muscles are intact. There is no kern icterus. Pupils are equal, round, and reactive to light. No redness or discharge bilaterally. Skin:  There are no rashes or lesions.     Lymp will get cervical spine x-rays. She will start PT on her cervical spine and thoracic outlet. The patient does not need any pain medications at this time.     She will follow up in 4-6 weeks with the nurse practitioner and with me in 3 months or sooner

## 2018-02-14 ENCOUNTER — OFFICE VISIT (OUTPATIENT)
Dept: SPEECH THERAPY | Facility: HOSPITAL | Age: 78
End: 2018-02-14
Attending: INTERNAL MEDICINE
Payer: MEDICARE

## 2018-02-14 PROCEDURE — 92507 TX SP LANG VOICE COMM INDIV: CPT

## 2018-02-14 NOTE — PROGRESS NOTES
Dx: Apraxia of speech,         Authorized # of Visits:  7         Next MD visit: none scheduled  Fall Risk: standard         Precautions: n/a           Medication Changes since last visit?: No  Pain: 4/10 dull headache  Subjective: Patient has order for PT

## 2018-02-16 PROBLEM — M47.892 OTHER SPONDYLOSIS, CERVICAL REGION: Status: ACTIVE | Noted: 2018-02-16

## 2018-02-16 PROBLEM — M50.90 CERVICAL DISC DISEASE: Status: ACTIVE | Noted: 2018-02-16

## 2018-02-19 ENCOUNTER — OFFICE VISIT (OUTPATIENT)
Dept: SPEECH THERAPY | Facility: HOSPITAL | Age: 78
End: 2018-02-19
Attending: INTERNAL MEDICINE
Payer: MEDICARE

## 2018-02-19 PROCEDURE — 92507 TX SP LANG VOICE COMM INDIV: CPT

## 2018-02-19 NOTE — PROGRESS NOTES
Dx: Apraxia of speech,         Authorized # of Visits:  7         Next MD visit: none scheduled  Fall Risk: standard         Precautions: n/a           Medication Changes since last visit?: No  Pain: 0/10  Subjective: Patient has been practicing multiple m

## 2018-02-20 ENCOUNTER — OFFICE VISIT (OUTPATIENT)
Dept: PHYSICAL THERAPY | Facility: HOSPITAL | Age: 78
End: 2018-02-20
Attending: PHYSICAL MEDICINE & REHABILITATION
Payer: MEDICARE

## 2018-02-20 NOTE — PROGRESS NOTES
CERVICAL SPINE EVALUATION:   Referring Physician: Cory Danielson MD    Diagnosis: Bilateral neck pain  (primary encounter diagnosis)  Other complicated headache syndrome  TOS (thoracic outlet syndrome): mild bilaterally         Date of Service:2/20/2018 cervical spine to her R shoulder and scaplar area, decreased ROM, poor scapular and DNF strength and increased pain that limits her functional mobility. She will benefit from skilled PT to improve her functional mobility and decrease her pain.   She will a Treatment will include: Manual Therapy; Therapeutic Exercises; Neuromuscular Re-education; Therapeutic Activity;  Patient education; Home exercise program instruction; TNE Education, Modalities as   Improve cervical and thoracic segmental mobility and advan

## 2018-02-21 ENCOUNTER — OFFICE VISIT (OUTPATIENT)
Dept: SPEECH THERAPY | Facility: HOSPITAL | Age: 78
End: 2018-02-21
Attending: INTERNAL MEDICINE
Payer: MEDICARE

## 2018-02-21 PROCEDURE — 92507 TX SP LANG VOICE COMM INDIV: CPT

## 2018-02-21 NOTE — PROGRESS NOTES
Dx: Apraxia of speech,         Authorized # of Visits:  7         Next MD visit: none scheduled  Fall Risk: standard         Precautions: n/a           Medication Changes since last visit?: No  Pain: 0/10  Subjective: Patient has been practicing multiple m sessions. Will target scripts for recurrent speaking situations.       Charges: 1, Speech Therapy       Total Timed Treatment: 40 min  Total Treatment Time: 40 min

## 2018-02-23 ENCOUNTER — TELEPHONE (OUTPATIENT)
Dept: NEUROLOGY | Facility: CLINIC | Age: 78
End: 2018-02-23

## 2018-02-23 ENCOUNTER — OFFICE VISIT (OUTPATIENT)
Dept: PHYSICAL THERAPY | Facility: HOSPITAL | Age: 78
End: 2018-02-23
Attending: PHYSICAL MEDICINE & REHABILITATION
Payer: MEDICARE

## 2018-02-23 DIAGNOSIS — M54.2 BILATERAL NECK PAIN: ICD-10-CM

## 2018-02-23 DIAGNOSIS — G44.59 OTHER COMPLICATED HEADACHE SYNDROME: ICD-10-CM

## 2018-02-23 DIAGNOSIS — G54.0 TOS (THORACIC OUTLET SYNDROME): ICD-10-CM

## 2018-02-23 PROCEDURE — 97110 THERAPEUTIC EXERCISES: CPT | Performed by: PHYSICAL THERAPIST

## 2018-02-23 PROCEDURE — 97140 MANUAL THERAPY 1/> REGIONS: CPT | Performed by: PHYSICAL THERAPIST

## 2018-02-23 NOTE — TELEPHONE ENCOUNTER
Patient informed of the imaging results as interpreted by Dr. Hillary Hdz.  Patient verbalized understanding without further questions

## 2018-02-23 NOTE — PROGRESS NOTES
2/23/2018  Dx:    Bilateral neck pain  (primary encounter diagnosis)  Other complicated headache syndrome  TOS (thoracic outlet syndrome): mild bilaterally         Authorized # of Visits: 2/10          Next MD visit: none   Fall Risk: standard         Preca Therapeutic Activity; Patient education; Home exercise program instruction; TNE Education, Modalities as   Improve cervical and thoracic segmental mobility and advance to stability. Open up the thoracic outlet.   Scapular mobilization and stabiliaztion wit to tolerate. Current functional limitations included  Jeanie Aaron describes prior level of function not currently driving. Pt goals include minimize/eliminate the head and neck pain  Past medical history was reviewed with Jeanie Aaron.  Significant findings include (T1) 4 4       Flexibility:   R L   Pec Major short short   Pec Minor short short   Lats short short     Palpation: TTP over the R UT         PLAN OF CARE:    Goals:   1. Centralization of symptoms to the cervical spine.     2.  The pt will be independent

## 2018-02-23 NOTE — TELEPHONE ENCOUNTER
----- Message from Lauro Anthony MD sent at 2/16/2018 10:24 AM CST -----  She henry moderate disc degeneration and arthritis.   I would like to see how she does with the PT.

## 2018-02-26 ENCOUNTER — OFFICE VISIT (OUTPATIENT)
Dept: SPEECH THERAPY | Facility: HOSPITAL | Age: 78
End: 2018-02-26
Attending: INTERNAL MEDICINE
Payer: MEDICARE

## 2018-02-26 PROCEDURE — 92507 TX SP LANG VOICE COMM INDIV: CPT

## 2018-02-26 NOTE — PROGRESS NOTES
Dx: Apraxia of speech,         Authorized # of Visits:  7         Next MD visit: none scheduled  Fall Risk: standard         Precautions: n/a           Medication Changes since last visit?: No  Pain: 0/10  Subjective:  Patient feels there is improvement at

## 2018-02-28 ENCOUNTER — OFFICE VISIT (OUTPATIENT)
Dept: SPEECH THERAPY | Facility: HOSPITAL | Age: 78
End: 2018-02-28
Attending: INTERNAL MEDICINE
Payer: MEDICARE

## 2018-02-28 PROCEDURE — 92507 TX SP LANG VOICE COMM INDIV: CPT

## 2018-02-28 NOTE — PROGRESS NOTES
Dx: Apraxia of speech,         Authorized # of Visits:  7         Next MD visit: none scheduled  Fall Risk: standard         Precautions: n/a           Medication Changes since last visit?: No  Pain: 0/10  Subjective:  Patient was with grandchildren last n

## 2018-03-05 ENCOUNTER — OFFICE VISIT (OUTPATIENT)
Dept: SPEECH THERAPY | Facility: HOSPITAL | Age: 78
End: 2018-03-05
Attending: INTERNAL MEDICINE
Payer: MEDICARE

## 2018-03-05 PROCEDURE — 92507 TX SP LANG VOICE COMM INDIV: CPT

## 2018-03-05 NOTE — PROGRESS NOTES
Patient Name: Emre Thorpe, : 1940, MRN: [id number]   Date:  3/5/2018  Referring Physician:  Annel Nava    Diagnosis: Apraxia of speech, anomia    Discharge Summary  Pt has attended 8 visits since last progress note of 18 and 18 visits with any questions or concerns at [phone]. Riri Jauregui MA, [facility]  [address] [address]  Phone:  [phone]  Fax:  [phone]    Electronically signed by Orly De La Rosa

## 2018-03-06 ENCOUNTER — OFFICE VISIT (OUTPATIENT)
Dept: PHYSICAL THERAPY | Facility: HOSPITAL | Age: 78
End: 2018-03-06
Attending: PHYSICAL MEDICINE & REHABILITATION
Payer: MEDICARE

## 2018-03-06 DIAGNOSIS — M54.2 BILATERAL NECK PAIN: ICD-10-CM

## 2018-03-06 DIAGNOSIS — G44.59 OTHER COMPLICATED HEADACHE SYNDROME: ICD-10-CM

## 2018-03-06 DIAGNOSIS — G54.0 TOS (THORACIC OUTLET SYNDROME): ICD-10-CM

## 2018-03-06 PROCEDURE — 97140 MANUAL THERAPY 1/> REGIONS: CPT | Performed by: PHYSICAL THERAPIST

## 2018-03-06 PROCEDURE — 97110 THERAPEUTIC EXERCISES: CPT | Performed by: PHYSICAL THERAPIST

## 2018-03-06 NOTE — PROGRESS NOTES
3/6/2018  Dx:    Bilateral neck pain  (primary encounter diagnosis)  Other complicated headache syndrome  TOS (thoracic outlet syndrome): mild bilaterally         Authorized # of Visits: 3/10          Next MD visit: none   Fall Risk: standard         Precau will be able to lie down with pillow support without an increase in symptoms. 5.  The pt will be independent in her HEP. Frequency/Duration: Patient will be seen for 1-2 x/week or a total of 10 visits over a 90 day period. Treatment will include:  Man her grandchildren. Walks with a cane. Feels cloudy when in a busy environment. Sees speech therapy secondary to difficulty with speech. History of current condition: severe scoliotic - 70% curvature in the spine.   Lost a significant amount of h unable to reach back of head Able to reach the back of her head   IR NT NT     Strength UE:   5/5 MMT Scale   R  L   Shoulder flex  3- 4+   Shoulder ext NT NT   Abduction (C5) 3- 4+   ER NT NT   IR NT NT   Biceps (C6) 4+ 4+   Wrist ext (C6) 4+ 4+   Triceps

## 2018-03-09 ENCOUNTER — OFFICE VISIT (OUTPATIENT)
Dept: PHYSICAL THERAPY | Facility: HOSPITAL | Age: 78
End: 2018-03-09
Attending: PHYSICAL MEDICINE & REHABILITATION
Payer: MEDICARE

## 2018-03-09 DIAGNOSIS — G54.0 TOS (THORACIC OUTLET SYNDROME): ICD-10-CM

## 2018-03-09 DIAGNOSIS — G44.59 OTHER COMPLICATED HEADACHE SYNDROME: ICD-10-CM

## 2018-03-09 DIAGNOSIS — M54.2 BILATERAL NECK PAIN: ICD-10-CM

## 2018-03-09 PROCEDURE — 97140 MANUAL THERAPY 1/> REGIONS: CPT | Performed by: PHYSICAL THERAPIST

## 2018-03-09 PROCEDURE — 97112 NEUROMUSCULAR REEDUCATION: CPT | Performed by: PHYSICAL THERAPIST

## 2018-03-09 NOTE — PROGRESS NOTES
3/9/2018  Dx:    Bilateral neck pain  (primary encounter diagnosis)  Other complicated headache syndrome  TOS (thoracic outlet syndrome): mild bilaterally         Authorized # of Visits: 4/10          Next MD visit: none   Fall Risk: standard         Precau further postural control and strength to continue to decrease her neck pain. The pt does report less pain overall. Advised pt to try and sit with upright posture at the dinner table and to keep a slight chin tuck when writing. Goals:   1.   Cathy Blake pain.  Sleeps in a chair because she can't lie flat and can't tolerate the pressure on her hand with lies down. Wakes up with her arms numb. Gets nerve pain in the R UT.   Reports numbness from a previous episode of shingles in 2011 along the R breast and flexed posture with extreme FHP, scolotic curvature, the pt's feet do not touch the floor    Cervical AROM: In sitting Pain (+/-)   Flexion 55    Extension 60    R Sidebend 15    L Sidebend 15    R Rotation 60    L Rotation 55    Protrusion WFL    Retracti and Maintaining Body Position, CL: 60-79% impaired, limited, or restricted  Goal status G Code: Changing and Maintaining Body Position CK: 40-59% impaired, limited, or restricted    Certification From: 9/25/5031      To: 5/21/2018

## 2018-03-13 ENCOUNTER — OFFICE VISIT (OUTPATIENT)
Dept: PHYSICAL THERAPY | Facility: HOSPITAL | Age: 78
End: 2018-03-13
Attending: PHYSICAL MEDICINE & REHABILITATION
Payer: MEDICARE

## 2018-03-13 DIAGNOSIS — G44.59 OTHER COMPLICATED HEADACHE SYNDROME: ICD-10-CM

## 2018-03-13 DIAGNOSIS — G54.0 TOS (THORACIC OUTLET SYNDROME): ICD-10-CM

## 2018-03-13 DIAGNOSIS — M54.2 BILATERAL NECK PAIN: ICD-10-CM

## 2018-03-13 PROCEDURE — 97110 THERAPEUTIC EXERCISES: CPT | Performed by: PHYSICAL THERAPIST

## 2018-03-13 PROCEDURE — 97140 MANUAL THERAPY 1/> REGIONS: CPT | Performed by: PHYSICAL THERAPIST

## 2018-03-13 NOTE — PROGRESS NOTES
3/13/2018    Dx:    Bilateral neck pain  (primary encounter diagnosis)  Other complicated headache syndrome  TOS (thoracic outlet syndrome): mild bilaterally         Authorized # of Visits: 5/10          Next MD visit: none   Fall Risk: standard         Pre Displayed improved posture after visit but R arm motion limits her ability to complete exercises in sitting. Goals:   1. Centralization of symptoms to the cervical spine.     2.  The pt will be independent in postural principles to support her trunk and with her arms numb. Gets nerve pain in the R UT. Reports numbness from a previous episode of shingles in 2011 along the R breast and R flank. Has a dull HA all the time from the top of the scalp to the occiput.   Gets pain in the face at all times, espec (+/-)   Flexion 55    Extension 60    R Sidebend 15    L Sidebend 15    R Rotation 60    L Rotation 55    Protrusion WFL    Retraction 50% less than neutral fatigues quickly     Repeated Motion Testing: repeated retraction in sitting increased motion, no c Body Position CK: 40-59% impaired, limited, or restricted    Certification From: 1/79/2962      To: 5/21/2018

## 2018-03-16 ENCOUNTER — OFFICE VISIT (OUTPATIENT)
Dept: PHYSICAL THERAPY | Facility: HOSPITAL | Age: 78
End: 2018-03-16
Attending: PHYSICAL MEDICINE & REHABILITATION
Payer: MEDICARE

## 2018-03-16 DIAGNOSIS — G54.0 TOS (THORACIC OUTLET SYNDROME): ICD-10-CM

## 2018-03-16 DIAGNOSIS — G44.59 OTHER COMPLICATED HEADACHE SYNDROME: ICD-10-CM

## 2018-03-16 DIAGNOSIS — M54.2 BILATERAL NECK PAIN: ICD-10-CM

## 2018-03-16 PROCEDURE — 97140 MANUAL THERAPY 1/> REGIONS: CPT | Performed by: PHYSICAL THERAPIST

## 2018-03-16 PROCEDURE — 97110 THERAPEUTIC EXERCISES: CPT | Performed by: PHYSICAL THERAPIST

## 2018-03-16 NOTE — PROGRESS NOTES
3/16/2018  Dx:    Bilateral neck pain  (primary encounter diagnosis)  Other complicated headache syndrome  TOS (thoracic outlet syndrome): mild bilaterally         Authorized # of Visits: 6/10          Next MD visit: none   Fall Risk: standard         Preca sitting    Wall push ups    Sitting with equal pressure on sit bones    Scapular retraction in sitting with ball  Arm/leg lengthener    Standing upright against the wall    Hip extension toe taps with upright posture   Therapeutic Activity        Neuromusc Treatment: 45 min  Total Treatment Time: 45 min          CERVICAL SPINE EVALUATION:   Referring Physician: Carloz Ortiz MD    Diagnosis: Bilateral neck pain  (primary encounter diagnosis)  Other complicated headache syndrome  TOS (thoracic outlet syndro significant FHP, radicular symptoms on the R side of her cervical spine to her R shoulder and scaplar area, decreased ROM, poor scapular and DNF strength and increased pain that limits her functional mobility.   She will benefit from skilled PT to improve h x/week or a total of 10 visits over a 90 day period. Treatment will include: Manual Therapy; Therapeutic Exercises; Neuromuscular Re-education; Therapeutic Activity;  Patient education; Home exercise program instruction; TNE Education, Modalities as   Impro

## 2018-03-19 ENCOUNTER — HOSPITAL ENCOUNTER (EMERGENCY)
Facility: HOSPITAL | Age: 78
Discharge: HOME | End: 2018-03-19
Attending: EMERGENCY MEDICINE
Payer: MEDICARE

## 2018-03-19 VITALS
WEIGHT: 170 LBS | HEIGHT: 55 IN | TEMPERATURE: 98 F | RESPIRATION RATE: 17 BRPM | BODY MASS INDEX: 39.34 KG/M2 | HEART RATE: 82 BPM | DIASTOLIC BLOOD PRESSURE: 75 MMHG | SYSTOLIC BLOOD PRESSURE: 144 MMHG | OXYGEN SATURATION: 97 %

## 2018-03-19 DIAGNOSIS — R42 VERTIGO: Primary | ICD-10-CM

## 2018-03-19 DIAGNOSIS — I10 ESSENTIAL HYPERTENSION: ICD-10-CM

## 2018-03-19 LAB
ANION GAP SERPL CALC-SCNC: 8 MMOL/L (ref 0–18)
BASOPHILS # BLD: 0 K/UL (ref 0–0.2)
BASOPHILS NFR BLD: 0 %
BUN SERPL-MCNC: 11 MG/DL (ref 8–20)
BUN/CREAT SERPL: 16.2 (ref 10–20)
CALCIUM SERPL-MCNC: 9.4 MG/DL (ref 8.5–10.5)
CHLORIDE SERPL-SCNC: 107 MMOL/L (ref 95–110)
CO2 SERPL-SCNC: 25 MMOL/L (ref 22–32)
CREAT SERPL-MCNC: 0.68 MG/DL (ref 0.5–1.5)
EOSINOPHIL # BLD: 0.1 K/UL (ref 0–0.7)
EOSINOPHIL NFR BLD: 1 %
ERYTHROCYTE [DISTWIDTH] IN BLOOD BY AUTOMATED COUNT: 14.7 % (ref 11–15)
GLUCOSE SERPL-MCNC: 111 MG/DL (ref 70–99)
HCT VFR BLD AUTO: 42.3 % (ref 35–48)
HGB BLD-MCNC: 14.1 G/DL (ref 12–16)
LYMPHOCYTES # BLD: 1.6 K/UL (ref 1–4)
LYMPHOCYTES NFR BLD: 29 %
MCH RBC QN AUTO: 29.9 PG (ref 27–32)
MCHC RBC AUTO-ENTMCNC: 33.4 G/DL (ref 32–37)
MCV RBC AUTO: 89.5 FL (ref 80–100)
MONOCYTES # BLD: 0.3 K/UL (ref 0–1)
MONOCYTES NFR BLD: 6 %
NEUTROPHILS # BLD AUTO: 3.5 K/UL (ref 1.8–7.7)
NEUTROPHILS NFR BLD: 64 %
OSMOLALITY UR CALC.SUM OF ELEC: 290 MOSM/KG (ref 275–295)
PLATELET # BLD AUTO: 305 K/UL (ref 140–400)
PMV BLD AUTO: 8 FL (ref 7.4–10.3)
POTASSIUM SERPL-SCNC: 4 MMOL/L (ref 3.3–5.1)
RBC # BLD AUTO: 4.73 M/UL (ref 3.7–5.4)
SODIUM SERPL-SCNC: 140 MMOL/L (ref 136–144)
TROPONIN I SERPL-MCNC: 0 NG/ML (ref ?–0.03)
WBC # BLD AUTO: 5.4 K/UL (ref 4–11)

## 2018-03-19 PROCEDURE — 93010 ELECTROCARDIOGRAM REPORT: CPT | Performed by: EMERGENCY MEDICINE

## 2018-03-19 PROCEDURE — 84484 ASSAY OF TROPONIN QUANT: CPT | Performed by: EMERGENCY MEDICINE

## 2018-03-19 PROCEDURE — 36415 COLL VENOUS BLD VENIPUNCTURE: CPT

## 2018-03-19 PROCEDURE — 85025 COMPLETE CBC W/AUTO DIFF WBC: CPT | Performed by: EMERGENCY MEDICINE

## 2018-03-19 PROCEDURE — 99284 EMERGENCY DEPT VISIT MOD MDM: CPT

## 2018-03-19 PROCEDURE — 93005 ELECTROCARDIOGRAM TRACING: CPT

## 2018-03-19 PROCEDURE — 80048 BASIC METABOLIC PNL TOTAL CA: CPT | Performed by: EMERGENCY MEDICINE

## 2018-03-19 NOTE — ED PROVIDER NOTES
Patient Seen in: [facility] AND [facility] Emergency Department    History   Patient presents with:  Dizziness (neurologic)    Stated Complaint: Sent by Dr. Alpa Suresh for HTN, dizziness    HPI    65year old female with pmh anemia, gerd, h/o concussion in the past c All other systems reviewed and negative except as noted above.     Physical Exam   ED Triage Vitals  BP: 154/67 [03/19/18 1021]  Pulse: 94 [03/19/18 1021]  Resp: 18 [03/19/18 1021]  Temp: 97.9 °F (36.6 °C) [03/19/18 1021]  Temp src: Oral [03/19/18 1021] TROPONIN I - Normal   CBC WITH DIFFERENTIAL WITH PLATELET    Narrative: The following orders were created for panel order CBC WITH DIFFERENTIAL WITH PLATELET.   Procedure                               Abnormality         Status                     --- Meryl [address]eg[address]  [phone]    Schedule an appointment as soon as possible for a visit in 2 days      We recommend that you schedule follow up care with a primary care provider within the next three months to obtain basic health sc

## 2018-03-19 NOTE — ED INITIAL ASSESSMENT (HPI)
Pt here for HTN at home 177/82. Pt states she called Dr. Kait Kellogg who told her to come in. Pt also reports dizziness.

## 2018-03-19 NOTE — ED NOTES
Discharged home with plan to follow up with PCP as indicated. Alert and interactive. Hemodynamically stable. Defers meds for dizziness.  Ambulates to exit with steady gait

## 2018-03-19 NOTE — ED NOTES
Care assumed from triage Presents via [facility] stand pivot to stretcher independently Lives with  at home and noted HTN on home monitor with associated dizziness To ED on referral from PCP Denies CP/SOB/HA + MARIO HAMILTON Hemodynamically stable

## 2018-03-20 ENCOUNTER — OFFICE VISIT (OUTPATIENT)
Dept: PHYSICAL THERAPY | Facility: HOSPITAL | Age: 78
End: 2018-03-20
Attending: PHYSICAL MEDICINE & REHABILITATION
Payer: MEDICARE

## 2018-03-20 PROCEDURE — 97112 NEUROMUSCULAR REEDUCATION: CPT | Performed by: PHYSICAL THERAPIST

## 2018-03-20 PROCEDURE — 97140 MANUAL THERAPY 1/> REGIONS: CPT | Performed by: PHYSICAL THERAPIST

## 2018-03-20 NOTE — PROGRESS NOTES
3/20/2018  Dx:    Bilateral neck pain  (primary encounter diagnosis)  Other complicated headache syndrome  TOS (thoracic outlet syndrome): mild bilaterally         Authorized # of Visits: 7/10          Next MD visit: none   Fall Risk: standard         Preca manually    Lower rib depression with exhalation Manual traction    STM/MFR cervical paraspinals and UT's    pec major and min STM and scar massage    pec stretch manually    Lower rib depression with exhalation    BC manual techniques    Manual traction immune responses and central sensitization topics were introduced using metaphors to promote deep learning. Assessment: No adverse effects to treatment  The pt was in no distress during the visit and vital signs were taken.   Only gentle manual therapy limited, or restricted    Certification From: 3/32/6408      To: 5/21/2018        Charges: Man2, NM1      Total Timed Treatment: 45 min  Total Treatment Time: 45 min          CERVICAL SPINE EVALUATION:   Referring Physician: Josephine Marcos MD    Diagnosis history and complex pattern of postural compensation secondary to scoliosis and poor trunk control.   She display significant FHP, radicular symptoms on the R side of her cervical spine to her R shoulder and scaplar area, decreased ROM, poor scapular and DN in symptoms. 5.  The pt will be independent in her HEP. Frequency/Duration: Patient will be seen for 1-2 x/week or a total of 10 visits over a 90 day period. Treatment will include: Manual Therapy; Therapeutic Exercises;  Neuromuscular Re-education; T

## 2018-04-03 ENCOUNTER — OFFICE VISIT (OUTPATIENT)
Dept: PHYSICAL THERAPY | Facility: HOSPITAL | Age: 78
End: 2018-04-03
Attending: PHYSICAL MEDICINE & REHABILITATION
Payer: MEDICARE

## 2018-04-03 PROCEDURE — 97140 MANUAL THERAPY 1/> REGIONS: CPT | Performed by: PHYSICAL THERAPIST

## 2018-04-03 PROCEDURE — 97112 NEUROMUSCULAR REEDUCATION: CPT | Performed by: PHYSICAL THERAPIST

## 2018-04-03 NOTE — PROGRESS NOTES
4/3/2018  Dx:    Bilateral neck pain  (primary encounter diagnosis)  Other complicated headache syndrome  TOS (thoracic outlet syndrome): mild bilaterally         Authorized # of Visits: 8/10          Next MD visit: none   Fall Risk: standard         Precau were explained using metaphors to promote deep learning. Education  Patient educated regarding spreading pain symptoms, and that feeling pain in adjacent areas of  the body does not indicate definite tissue injury.  Hyperalgesia, immune responses and centr min  Total Treatment Time: 45 min          CERVICAL SPINE EVALUATION:   Referring Physician: Gareth Lucas MD    Diagnosis: Bilateral neck pain  (primary encounter diagnosis)  Other complicated headache syndrome  TOS (thoracic outlet syndrome): mild bila radicular symptoms on the R side of her cervical spine to her R shoulder and scaplar area, decreased ROM, poor scapular and DNF strength and increased pain that limits her functional mobility.   She will benefit from skilled PT to improve her functional mob of 10 visits over a 90 day period. Treatment will include: Manual Therapy; Therapeutic Exercises; Neuromuscular Re-education; Therapeutic Activity;  Patient education; Home exercise program instruction; TNE Education, Modalities as   Improve cervical and th

## 2018-04-05 ENCOUNTER — APPOINTMENT (OUTPATIENT)
Dept: PHYSICAL THERAPY | Facility: HOSPITAL | Age: 78
End: 2018-04-05
Attending: PHYSICAL MEDICINE & REHABILITATION
Payer: MEDICARE

## 2018-04-06 ENCOUNTER — APPOINTMENT (OUTPATIENT)
Dept: PHYSICAL THERAPY | Facility: HOSPITAL | Age: 78
End: 2018-04-06
Attending: PHYSICAL MEDICINE & REHABILITATION
Payer: MEDICARE

## 2018-04-10 ENCOUNTER — APPOINTMENT (OUTPATIENT)
Dept: PHYSICAL THERAPY | Facility: HOSPITAL | Age: 78
End: 2018-04-10
Attending: PHYSICAL MEDICINE & REHABILITATION
Payer: MEDICARE

## 2018-04-11 ENCOUNTER — LAB REQUISITION (OUTPATIENT)
Dept: LAB | Facility: HOSPITAL | Age: 78
End: 2018-04-11
Payer: MEDICARE

## 2018-04-11 DIAGNOSIS — N39.0 URINARY TRACT INFECTION: ICD-10-CM

## 2018-04-11 PROCEDURE — 87086 URINE CULTURE/COLONY COUNT: CPT | Performed by: INTERNAL MEDICINE

## 2018-04-13 ENCOUNTER — OFFICE VISIT (OUTPATIENT)
Dept: PHYSICAL THERAPY | Facility: HOSPITAL | Age: 78
End: 2018-04-13
Attending: PHYSICAL MEDICINE & REHABILITATION
Payer: MEDICARE

## 2018-04-13 PROCEDURE — 97140 MANUAL THERAPY 1/> REGIONS: CPT | Performed by: PHYSICAL THERAPIST

## 2018-04-13 PROCEDURE — 97112 NEUROMUSCULAR REEDUCATION: CPT | Performed by: PHYSICAL THERAPIST

## 2018-04-17 ENCOUNTER — HOSPITAL ENCOUNTER (OUTPATIENT)
Dept: CT IMAGING | Facility: HOSPITAL | Age: 78
Discharge: HOME | End: 2018-04-17
Attending: INTERNAL MEDICINE
Payer: MEDICARE

## 2018-04-17 ENCOUNTER — APPOINTMENT (OUTPATIENT)
Dept: PHYSICAL THERAPY | Facility: HOSPITAL | Age: 78
End: 2018-04-17
Attending: PHYSICAL MEDICINE & REHABILITATION
Payer: MEDICARE

## 2018-04-17 DIAGNOSIS — R10.9 ABDOMINAL PAIN, UNSPECIFIED ABDOMINAL LOCATION: ICD-10-CM

## 2018-04-17 PROCEDURE — 74176 CT ABD & PELVIS W/O CONTRAST: CPT | Performed by: INTERNAL MEDICINE

## 2018-04-20 ENCOUNTER — APPOINTMENT (OUTPATIENT)
Dept: PHYSICAL THERAPY | Facility: HOSPITAL | Age: 78
End: 2018-04-20
Attending: PHYSICAL MEDICINE & REHABILITATION
Payer: MEDICARE

## 2018-04-24 ENCOUNTER — APPOINTMENT (OUTPATIENT)
Dept: PHYSICAL THERAPY | Facility: HOSPITAL | Age: 78
End: 2018-04-24
Attending: PHYSICAL MEDICINE & REHABILITATION
Payer: MEDICARE

## 2018-04-27 ENCOUNTER — APPOINTMENT (OUTPATIENT)
Dept: PHYSICAL THERAPY | Facility: HOSPITAL | Age: 78
End: 2018-04-27
Attending: PHYSICAL MEDICINE & REHABILITATION
Payer: MEDICARE

## 2018-05-08 ENCOUNTER — OFFICE VISIT (OUTPATIENT)
Dept: NEUROLOGY | Facility: CLINIC | Age: 78
End: 2018-05-08

## 2018-05-08 ENCOUNTER — LAB REQUISITION (OUTPATIENT)
Dept: LAB | Facility: HOSPITAL | Age: 78
End: 2018-05-08
Payer: MEDICARE

## 2018-05-08 VITALS — SYSTOLIC BLOOD PRESSURE: 144 MMHG | RESPIRATION RATE: 14 BRPM | DIASTOLIC BLOOD PRESSURE: 70 MMHG | HEART RATE: 90 BPM

## 2018-05-08 DIAGNOSIS — M62.89 PELVIC FLOOR DYSFUNCTION IN FEMALE: ICD-10-CM

## 2018-05-08 DIAGNOSIS — M48.061 LUMBAR FORAMINAL STENOSIS: Primary | ICD-10-CM

## 2018-05-08 DIAGNOSIS — M48.062 SPINAL STENOSIS OF LUMBAR REGION WITH NEUROGENIC CLAUDICATION: ICD-10-CM

## 2018-05-08 DIAGNOSIS — M51.9 LUMBAR DISC DISEASE: ICD-10-CM

## 2018-05-08 DIAGNOSIS — R30.0 DYSURIA: ICD-10-CM

## 2018-05-08 DIAGNOSIS — M43.16 SPONDYLOLISTHESIS OF LUMBAR REGION: ICD-10-CM

## 2018-05-08 PROCEDURE — 99214 OFFICE O/P EST MOD 30 MIN: CPT | Performed by: PHYSICAL MEDICINE & REHABILITATION

## 2018-05-08 PROCEDURE — 87086 URINE CULTURE/COLONY COUNT: CPT | Performed by: INTERNAL MEDICINE

## 2018-05-08 NOTE — PATIENT INSTRUCTIONS
As of October 6th 2014, the Drug Enforcement Agency [facility]) is reclassifying all hydrocodone combination medications from Schedule III to Schedule II. This includes medications such as Norco, Vicodin, Lortab, Zohydro, and Vicoprofen.     What this means for y will perform bilateral L3 TFESI(s) in the future if she is still having the pain once the yeast infection is cleared up. She would like to hold on trying Neurontin at this time. I will give her a prescription for pelvic floor PT.     She will call me

## 2018-05-08 NOTE — PROGRESS NOTES
Low Back Pain H & P    Chief Complaint: Patient presents with:  Back Pain: pt here for follow up with c/o left side low back pain that worsened after lying flat on back 4/2018. pt completed PT with Angela Landrum with great results.  pt continues hep. [facility] ([facility] [address]     PRE CANCEROUS SKIN CANCER ON FACE   • Colon polyps    • Deviated septum    • Esophageal reflux    • Essential hypertension    • Foot fracture     RIGHT   • Head concussion 05/06/2014   • Head concussion 2009   • History of hiatal hernia    • Hype distress. Patient does not have a cough. HEENT:  Extraocular muscles are intact. There is no kern icterus. Pupils are equal, round, and reactive to light. No redness or discharge bilaterally. Skin:  There are no rashes or lesions.     Vitals:   05/08/ PT.    She will call me in 2 weeks to let me know how she is doing after finishing the antibiotics for the yeast.    She will follow up in 3 months or sooner if needed. The patient understands and agrees with the stated plan. Genette Cables A. Viann Meckel, MD  5/8/20

## 2018-07-16 ENCOUNTER — PRIOR ORIGINAL RECORDS (OUTPATIENT)
Dept: OTHER | Age: 78
End: 2018-07-16

## 2018-07-19 ENCOUNTER — HOSPITAL ENCOUNTER (OUTPATIENT)
Dept: MAMMOGRAPHY | Facility: HOSPITAL | Age: 78
Discharge: HOME | End: 2018-07-19
Attending: INTERNAL MEDICINE
Payer: MEDICARE

## 2018-07-19 DIAGNOSIS — Z12.31 ENCOUNTER FOR SCREENING MAMMOGRAM FOR MALIGNANT NEOPLASM OF BREAST: ICD-10-CM

## 2018-07-19 PROCEDURE — 77067 SCR MAMMO BI INCL CAD: CPT | Performed by: INTERNAL MEDICINE

## 2018-07-24 ENCOUNTER — HOSPITAL ENCOUNTER (OUTPATIENT)
Dept: CV DIAGNOSTICS | Facility: HOSPITAL | Age: 78
Discharge: HOME | End: 2018-07-24
Attending: INTERNAL MEDICINE
Payer: MEDICARE

## 2018-07-24 DIAGNOSIS — I06.0 RHEUMATIC AORTIC STENOSIS: ICD-10-CM

## 2018-07-24 DIAGNOSIS — R60.0 LOCALIZED EDEMA: ICD-10-CM

## 2018-07-24 PROCEDURE — 93306 TTE W/DOPPLER COMPLETE: CPT | Performed by: INTERNAL MEDICINE

## 2018-07-26 ENCOUNTER — HOSPITAL ENCOUNTER (OUTPATIENT)
Dept: MAMMOGRAPHY | Facility: HOSPITAL | Age: 78
Discharge: HOME | End: 2018-07-26
Attending: INTERNAL MEDICINE
Payer: MEDICARE

## 2018-07-26 DIAGNOSIS — R92.8 ABNORMAL MAMMOGRAM: ICD-10-CM

## 2018-07-26 PROCEDURE — 77061 BREAST TOMOSYNTHESIS UNI: CPT | Performed by: INTERNAL MEDICINE

## 2018-07-26 PROCEDURE — 77065 DX MAMMO INCL CAD UNI: CPT | Performed by: INTERNAL MEDICINE

## 2018-07-30 ENCOUNTER — OFFICE VISIT (OUTPATIENT)
Dept: NEUROLOGY | Facility: CLINIC | Age: 78
End: 2018-07-30
Payer: COMMERCIAL

## 2018-07-30 VITALS — RESPIRATION RATE: 15 BRPM | SYSTOLIC BLOOD PRESSURE: 146 MMHG | DIASTOLIC BLOOD PRESSURE: 82 MMHG | HEART RATE: 74 BPM

## 2018-07-30 DIAGNOSIS — M54.2 BILATERAL NECK PAIN: ICD-10-CM

## 2018-07-30 DIAGNOSIS — M48.062 SPINAL STENOSIS OF LUMBAR REGION WITH NEUROGENIC CLAUDICATION: ICD-10-CM

## 2018-07-30 DIAGNOSIS — M48.061 LUMBAR FORAMINAL STENOSIS: ICD-10-CM

## 2018-07-30 DIAGNOSIS — M47.892 OTHER SPONDYLOSIS, CERVICAL REGION: ICD-10-CM

## 2018-07-30 DIAGNOSIS — M50.90 CERVICAL DISC DISEASE: Primary | ICD-10-CM

## 2018-07-30 DIAGNOSIS — M43.16 SPONDYLOLISTHESIS OF LUMBAR REGION: ICD-10-CM

## 2018-07-30 DIAGNOSIS — M51.9 LUMBAR DISC DISEASE: ICD-10-CM

## 2018-07-30 PROCEDURE — 99214 OFFICE O/P EST MOD 30 MIN: CPT | Performed by: PHYSICAL MEDICINE & REHABILITATION

## 2018-07-30 RX ORDER — ACETAMINOPHEN 500 MG
500 TABLET ORAL EVERY 6 HOURS PRN
COMMUNITY

## 2018-07-30 RX ORDER — TRIAMTERENE AND HYDROCHLOROTHIAZIDE 37.5; 25 MG/1; MG/1
1 CAPSULE ORAL DAILY
Refills: 0 | COMMUNITY
Start: 2018-07-19 | End: 2019-05-01

## 2018-07-30 NOTE — PATIENT INSTRUCTIONS
As of October 6th 2014, the Drug Enforcement Agency [facility]) is reclassifying all hydrocodone combination medications from Schedule III to Schedule II. This includes medications such as Norco, Vicodin, Lortab, Zohydro, and Vicoprofen.     What this means for y will get back into PT with [facility] for her neck. The patient does not need any pain medications at this time. The patient does not need any injections at this time. She will follow up in 3 months or sooner if needed.

## 2018-07-30 NOTE — PROGRESS NOTES
Cervical Pain H & P    Chief Complaint:  Patient presents with:  Low Back Pain: pt here for follow up with c/o low back pain that is temporary when bending/lifting or lying. pt also c/o neck pain and left shoulder pain.  completed physical therapy with vandana Comment: DEVIATED SEPTUM   No date: SHOULDER SURG PROC UNLISTED      Comment: Right  1957: TONSILLECTOMY    Family History   Family History   Problem Relation Age of Onset   • Breast Cancer Maternal Aunt 44   • Breast Cancer Maternal Cousin Female Tender at C7, T1, T2, T3 and T4   Z-Joints Palpations: Tender at  bilateral C4-5, bilateral C5-6 and bilateral C6-7   Muscular Palpations: Non-tender to palpation.    Cervical Flexion: 45 degrees Gives the patient pain in the left neck   Cervical Extension: any injections at this time. She will follow up in 3 months or sooner if needed. The patient understands and agrees with the stated plan. Erik Bolivar MD  7/30/2018

## 2018-08-20 ENCOUNTER — HOSPITAL ENCOUNTER (EMERGENCY)
Facility: HOSPITAL | Age: 78
Discharge: HOME | End: 2018-08-20
Attending: EMERGENCY MEDICINE
Payer: MEDICARE

## 2018-08-20 ENCOUNTER — APPOINTMENT (OUTPATIENT)
Dept: GENERAL RADIOLOGY | Facility: HOSPITAL | Age: 78
End: 2018-08-20
Attending: EMERGENCY MEDICINE
Payer: MEDICARE

## 2018-08-20 VITALS
HEART RATE: 82 BPM | DIASTOLIC BLOOD PRESSURE: 53 MMHG | OXYGEN SATURATION: 95 % | BODY MASS INDEX: 38 KG/M2 | SYSTOLIC BLOOD PRESSURE: 134 MMHG | RESPIRATION RATE: 18 BRPM | TEMPERATURE: 99 F | WEIGHT: 163 LBS

## 2018-08-20 DIAGNOSIS — S90.31XA CONTUSION OF RIGHT FOOT, INITIAL ENCOUNTER: Primary | ICD-10-CM

## 2018-08-20 PROCEDURE — 99284 EMERGENCY DEPT VISIT MOD MDM: CPT

## 2018-08-20 PROCEDURE — 73630 X-RAY EXAM OF FOOT: CPT | Performed by: EMERGENCY MEDICINE

## 2018-08-20 RX ORDER — ACETAMINOPHEN 500 MG
1000 TABLET ORAL ONCE
Status: COMPLETED | OUTPATIENT
Start: 2018-08-20 | End: 2018-08-20

## 2018-08-21 ENCOUNTER — TELEPHONE (OUTPATIENT)
Dept: PHYSICAL THERAPY | Facility: HOSPITAL | Age: 78
End: 2018-08-21

## 2018-08-21 ENCOUNTER — APPOINTMENT (OUTPATIENT)
Dept: PHYSICAL THERAPY | Facility: HOSPITAL | Age: 78
End: 2018-08-21
Attending: PHYSICAL MEDICINE & REHABILITATION
Payer: MEDICARE

## 2018-08-21 NOTE — TELEPHONE ENCOUNTER
Dear Michelle Santana,    I am writing on behalf of my mother, Roxana Villanueva, in regards to her 8 am appointment for this morning. We apologize for the late notice, but we will need to cancel this appointment.  My mom suffered a minor accident on her foot and we just r

## 2018-08-21 NOTE — ED PROVIDER NOTES
Patient Seen in: [facility] AND [facility] Emergency Department    History   Patient presents with:   Foot Pain    Stated Complaint: R foot pain    HPI    HPI: Aida Mascorro is a 65year old female who presents after an injury to the rfoot that occurred tonight atorvastatin 10 MG Oral Tab,  1 tablet daily.        Family History   Problem Relation Age of Onset   • Breast Cancer Maternal Aunt 44   • Breast Cancer Maternal Cousin Female      age 35s   • Cancer Maternal Cousin Male      pancreatic ca twins   • Breast diagnosis)    Disposition:  Discharge    Follow-up:  Evelia Morales MD  [address] [address] [phone]            Medications Prescribed:  Discharge Medication List as of 8/20/2018 11:36 PM

## 2018-08-21 NOTE — ED NOTES
Post op shoe in place as ordered. Patient educated on home care and importance of PCP FU. + C/M/S. RICE therapy explained and understood- patient given ice pack for home.  She verbalizes understanding of provided information and denies any further questions

## 2018-08-21 NOTE — ED NOTES
Pt present s/p a camera tripod fall on top of foot from standing position. States is unable to walk without severe pain, hx of osteopenia. + pedal pulses, able to move toes, 2+ cap refill.

## 2018-08-24 ENCOUNTER — OFFICE VISIT (OUTPATIENT)
Dept: PHYSICAL THERAPY | Facility: HOSPITAL | Age: 78
End: 2018-08-24
Attending: PHYSICAL MEDICINE & REHABILITATION
Payer: MEDICARE

## 2018-08-24 DIAGNOSIS — M47.892 OTHER SPONDYLOSIS, CERVICAL REGION: ICD-10-CM

## 2018-08-24 DIAGNOSIS — M50.90 CERVICAL DISC DISEASE: ICD-10-CM

## 2018-08-24 DIAGNOSIS — M54.2 BILATERAL NECK PAIN: ICD-10-CM

## 2018-08-24 PROCEDURE — 97140 MANUAL THERAPY 1/> REGIONS: CPT | Performed by: PHYSICAL THERAPIST

## 2018-08-24 PROCEDURE — 97162 PT EVAL MOD COMPLEX 30 MIN: CPT | Performed by: PHYSICAL THERAPIST

## 2018-08-24 PROCEDURE — 97110 THERAPEUTIC EXERCISES: CPT | Performed by: PHYSICAL THERAPIST

## 2018-08-24 NOTE — PROGRESS NOTES
CERVICAL SPINE EVALUATION:   Referring Physician: Carina Florez MD    DiagnosisCervical disc disease (M50.90)  Other spondylosis, cervical region (P18.463)  Bilateral neck pain (M54.2) Date of Service:  8/24/2018   Date of Onset: several years ago [facility]) (Chronic)   Noted: 10/16/2017   Pelvic floor dysfunction in female   Noted: 5/8/2018   Other spondylosis, cervical region: moderate   Noted: 2/16/2018   Cervical disc disease: C3-4 & C4-5 > C6-7 > C5-6   Noted: 2/16/2018   Bilateral neck pain   Noted spine motion, extreme FHP and poor trunk control. She will benefit from skilled PT to help decrease her pain and improve her function.     Precautions: None and Fall Risk       OBJECTIVE:   Observation/Posture: extreme FHP, forward/rounded shoulders, forwa Exercises; Neuromuscular Re-education; Therapeutic Activity; Patient education; Home exercise program instruction; TNE Education, Modalities as needed.     Education or treatment limitation: None  Rehab Potential: good  Current status G Code: Initial, Carry

## 2018-08-31 ENCOUNTER — OFFICE VISIT (OUTPATIENT)
Dept: PHYSICAL THERAPY | Facility: HOSPITAL | Age: 78
End: 2018-08-31
Attending: PHYSICAL MEDICINE & REHABILITATION
Payer: MEDICARE

## 2018-08-31 PROCEDURE — 97110 THERAPEUTIC EXERCISES: CPT | Performed by: PHYSICAL THERAPIST

## 2018-08-31 PROCEDURE — 97140 MANUAL THERAPY 1/> REGIONS: CPT | Performed by: PHYSICAL THERAPIST

## 2018-08-31 NOTE — PROGRESS NOTES
8/31/2018  Dx: Cervical disc disease (M50.90)  Other spondylosis, cervical region (Q06.107)  Bilateral neck pain (M54.2)            Authorized # of Visits: 2/10 CINDY AND ANT [facility] Medicare, Gerry Hoang         Next MD visit: none   Fall Risk: standard         Precautions: will include: Manual Therapy; Therapeutic Exercises; Neuromuscular Re-education; Therapeutic Activity; Patient education; Home exercise program instruction; TNE Education, Modalities as needed.     Education or treatment limitation: None  Rehab Potential: g worst,     Denies dropping items, denies loss of bowel and bladder control, denies falls. States she now takes Benefiber and Miralax daily to eliminate. Wearing compression stockings per her PCP.   Reports she has burning pain in her feet secondary to cash History of colon polyps   Noted: 8/30/2017   Esophageal dysphagia   Noted: 8/30/2017   Bilateral occipital neuralgia   Noted: 11/30/2016   Chest pain of uncertain etiology   Noted: 10/18/2016   Gastroesophageal reflux disease, esophagitis presence not sp Minor short short   Lats short short     Palpation: TTP over the cervical paraspinals and UTs' bilaterally, R worse than L  Upper Limb Tension Tests: (+) L

## 2018-09-04 ENCOUNTER — OFFICE VISIT (OUTPATIENT)
Dept: PHYSICAL THERAPY | Facility: HOSPITAL | Age: 78
End: 2018-09-04
Attending: PHYSICAL MEDICINE & REHABILITATION
Payer: MEDICARE

## 2018-09-04 PROCEDURE — 97110 THERAPEUTIC EXERCISES: CPT | Performed by: PHYSICAL THERAPIST

## 2018-09-04 PROCEDURE — 97140 MANUAL THERAPY 1/> REGIONS: CPT | Performed by: PHYSICAL THERAPIST

## 2018-09-04 NOTE — PROGRESS NOTES
9/4/2018  Dx: Cervical disc disease (M50.90)  Other spondylosis, cervical region (S54.160)  Bilateral neck pain (M54.2)            Authorized # of Visits: 3/10 [facility] Medicare, Irina Calles)         Next MD visit: none   Fall Risk: standard         Precautions: n to stand with upright posture against the wall to improve trunk control. 4.  The pt will be independent in her HEP. 5.  The pt will be able to look down and read for 30 minutes without an increase in pain.     Frequency/Duration: Patient will be seen for backroom. Reports her 4th finger on the R intermittently curls but she can straighten it with her other hand. States the symptoms worsened after stopping therapy the last time. Reports she is not doing her HEP regularly.   States she does her HEP when sh bilateral L3-4 and S1 radiculopathies   Noted: 10/16/2017   Chronic left-sided low back pain with left-sided sciatica   Noted: 10/16/2017   Chronic left-sided thoracic back pain   Noted: 10/16/2017   Idiopathic peripheral neuropathy   Noted: 10/16/2017 Shoulder flex       4- 4+   Shoulder ext NT NT   Abduction (C5) 4- 4+   ER NT NT   IR NT NT   Biceps (C6) 5 5   Wrist ext (C6) 5 5   Triceps (C7) 5 5   Wrist flex (C7) 5 5   EPL (C8)  5 5   Interossei (T1) 5 5       Flexibility:   R L   Upper trap short

## 2018-09-05 ENCOUNTER — TELEPHONE (OUTPATIENT)
Dept: PHYSICAL THERAPY | Facility: HOSPITAL | Age: 78
End: 2018-09-05

## 2018-09-06 ENCOUNTER — APPOINTMENT (OUTPATIENT)
Dept: PHYSICAL THERAPY | Facility: HOSPITAL | Age: 78
End: 2018-09-06
Attending: PHYSICAL MEDICINE & REHABILITATION
Payer: MEDICARE

## 2018-09-11 ENCOUNTER — OFFICE VISIT (OUTPATIENT)
Dept: PHYSICAL THERAPY | Facility: HOSPITAL | Age: 78
End: 2018-09-11
Attending: PHYSICAL MEDICINE & REHABILITATION
Payer: MEDICARE

## 2018-09-11 PROCEDURE — 97140 MANUAL THERAPY 1/> REGIONS: CPT | Performed by: PHYSICAL THERAPIST

## 2018-09-11 NOTE — PROGRESS NOTES
9/11/2018    Dx: Cervical disc disease (M50.90)  Other spondylosis, cervical region (P60.614)  Bilateral neck pain (M54.2)            Authorized # of Visits: 4/10 [facility] Medicare, [facility])         Next MD visit: none   Fall Risk: standard         Precautions cervical spine. 2.  The pt will be independent in postural principles and supporting her trunk while sitting. 3.  The pt will be able to stand with upright posture against the wall to improve trunk control. 4.  The pt will be independent in her HEP.   5. more comfortable. Does feel well rested when she wakes up. States she wakes up in the night both from pain and to go to the backroom. Reports her 4th finger on the R intermittently curls but she can straighten it with her other hand.   States the symptom right large far lateral bulging discs   Noted: 10/16/2017   L2-3 right mod-severe lateral recess stenosis   Noted: 10/16/2017   bilateral L3-4 and S1 radiculopathies   Noted: 10/16/2017   Chronic left-sided low back pain with left-sided sciatica   Noted: 1 head Hands behind head   IR To waistline behind back To waistline behind back         Strength UE:   5/5 MMT Scale   R  L   Shoulder flex       4- 4+   Shoulder ext NT NT   Abduction (C5) 4- 4+   ER NT NT   IR NT NT   Biceps (C6) 5 5   Wrist ext (C6) 5 5

## 2018-09-13 ENCOUNTER — OFFICE VISIT (OUTPATIENT)
Dept: PHYSICAL THERAPY | Facility: HOSPITAL | Age: 78
End: 2018-09-13
Attending: PHYSICAL MEDICINE & REHABILITATION
Payer: MEDICARE

## 2018-09-13 PROCEDURE — 97140 MANUAL THERAPY 1/> REGIONS: CPT | Performed by: PHYSICAL THERAPIST

## 2018-09-13 PROCEDURE — 97110 THERAPEUTIC EXERCISES: CPT | Performed by: PHYSICAL THERAPIST

## 2018-09-13 NOTE — PROGRESS NOTES
9/13/2018  Dx: Cervical disc disease (M50.90)  Other spondylosis, cervical region (G03.386)  Bilateral neck pain (M54.2)            Authorized # of Visits: 5/10 [facility])         Next MD visit: none   Fall Risk: standard         Precautions: of the session but continues to c/o pain and display significant myospasm in the L UT. Goals:   1. Centralization of symptoms to the cervical spine. 2.  The pt will be independent in postural principles and supporting her trunk while sitting.   3.  T recliner because lying in the bed causes more pain in her cervical spine and hips. States she sleeps with her head to the R side because it is more comfortable. Does feel well rested when she wakes up.   States she wakes up in the night both from pain and thoracolumbar region   Noted: 10/16/2017   L5-S1 left mod foraminal, L4-5 bilateral mod far lateral, L3-4 bilateral mod-large far lateral, L1-2 right large far lateral bulging discs   Noted: 10/16/2017   L2-3 right mod-severe lateral recess stenosis   Note symptoms, better    HECTOR Testing:      Apical Expansion: (+) bilaterally    Shoulder AROM:      R    L   Flex  100 150   Abd 80 150   ER Hands behind head Hands behind head   IR To waistline behind back To waistline behind back         Strength UE:   5/5 MM

## 2018-09-18 ENCOUNTER — OFFICE VISIT (OUTPATIENT)
Dept: PHYSICAL THERAPY | Facility: HOSPITAL | Age: 78
End: 2018-09-18
Attending: PHYSICAL MEDICINE & REHABILITATION
Payer: MEDICARE

## 2018-09-18 PROCEDURE — 97140 MANUAL THERAPY 1/> REGIONS: CPT | Performed by: PHYSICAL THERAPIST

## 2018-09-18 PROCEDURE — 97112 NEUROMUSCULAR REEDUCATION: CPT | Performed by: PHYSICAL THERAPIST

## 2018-09-18 NOTE — PROGRESS NOTES
9/18/2018  Dx: Cervical disc disease (M50.90)  Other spondylosis, cervical region (V22.350)  Bilateral neck pain (M54.2)            Authorized # of Visits: 6/10 [facility]         Next MD visit: none   Fall Risk: standard         Precautions: elongate the trunk with exhalation           Education: Patient was educated on the concept of pain as an output of the brain, including nociception versus pain,inhibition and facilitation, and threat value using metaphors to promote deep learning.     Educ Activity; Patient education; Home exercise program instruction; TNE Education, Modalities as needed.     Education or treatment limitation: None  Rehab Potential: good  Current status G Code: Initial, Carrying, Moving and Handling Objects, CL: 60-79% impair States she now takes Benefiber and Miralax daily to eliminate. Wearing compression stockings per her PCP. Reports she has burning pain in her feet secondary to neuropathy.       History of current condition:pain for several years that worsened after stopp Bilateral occipital neuralgia   Noted: 11/30/2016   Chest pain of uncertain etiology   Noted: 10/18/2016   Gastroesophageal reflux disease, esophagitis presence not specified   Noted: 10/18/2016           ASSESSMENT     Mrs. Christel Kolb presents back to [facility] and [facility] bilaterally, R worse than L  Upper Limb Tension Tests: (+) L

## 2018-09-20 ENCOUNTER — OFFICE VISIT (OUTPATIENT)
Dept: PHYSICAL THERAPY | Facility: HOSPITAL | Age: 78
End: 2018-09-20
Attending: PHYSICAL MEDICINE & REHABILITATION
Payer: MEDICARE

## 2018-09-20 PROCEDURE — 97112 NEUROMUSCULAR REEDUCATION: CPT | Performed by: PHYSICAL THERAPIST

## 2018-09-20 PROCEDURE — 97140 MANUAL THERAPY 1/> REGIONS: CPT | Performed by: PHYSICAL THERAPIST

## 2018-09-20 NOTE — PROGRESS NOTES
9/20/2018  Dx: Cervical disc disease (M50.90)  Other spondylosis, cervical region (M84.839)  Bilateral neck pain (M54.2)            Authorized # of Visits: 7/10 [facility] Medicare, Napoleon Delong         Next MD visit: none   Fall Risk: standard         Precautions: sitting    Ball walks up the wall x 5 reps             Therapeutic Activity         Neuromuscular Education     PNE education - see below Education on sleep hygiene   HEP Walker push ups standing against the wall    Ball push downs in sitting to elongate t Activity; Patient education; Home exercise program instruction; TNE Education, Modalities as needed.     Education or treatment limitation: None  Rehab Potential: good  Current status G Code: Initial, Carrying, Moving and Handling Objects, CL: 60-79% impair States she now takes Benefiber and Miralax daily to eliminate. Wearing compression stockings per her PCP. Reports she has burning pain in her feet secondary to neuropathy.       History of current condition:pain for several years that worsened after stopp Bilateral occipital neuralgia   Noted: 11/30/2016   Chest pain of uncertain etiology   Noted: 10/18/2016   Gastroesophageal reflux disease, esophagitis presence not specified   Noted: 10/18/2016           ASSESSMENT     Mrs. Jerman Lujan presents back to [facility] bilaterally, R worse than L  Upper Limb Tension Tests: (+) L

## 2018-09-28 ENCOUNTER — OFFICE VISIT (OUTPATIENT)
Dept: PHYSICAL THERAPY | Facility: HOSPITAL | Age: 78
End: 2018-09-28
Attending: PHYSICAL MEDICINE & REHABILITATION
Payer: MEDICARE

## 2018-09-28 PROCEDURE — 97140 MANUAL THERAPY 1/> REGIONS: CPT | Performed by: PHYSICAL THERAPIST

## 2018-09-29 NOTE — PROGRESS NOTES
9/28/2018  Dx: Cervical disc disease (M50.90)  Other spondylosis, cervical region (M24.642)  Bilateral neck pain (M54.2)            Authorized # of Visits: 7/10 [facility] Medicare, Sydney Johnson)         Next MD visit: none   Fall Risk: standard         Precautions: to elongate the trunk with exhalation            Education: Patient was educated on the concept of pain as an output of the brain, including nociception versus pain,inhibition and facilitation, and threat value using metaphors to promote deep learning. Handling Objects CK: 40-59% impaired, limited, or restricted      FOTO: 66% limitation currently, 51% predicted  NDI:  63.4  Physical Fear: 54(55)    Certification From: 2/61/4573      To: 11/22/2018        Charges:  Man 2      Total Timed Treatment: 30 min care  Current functional limitations include looking down  Iker Steiner describes prior level of function independent in all activities, although the pt does not drive. Pt goals include decrease her pain. Past medical history was reviewed with Iker Steiner.  Significant back and L shoulder pain that radiates down her L UE into her hand. She reports her symptoms worsened after she stopped therapy during her last episode of care.   She reports limitations with turning her head, looking down to read, lying in bed (the pt sle

## 2018-10-04 ENCOUNTER — OFFICE VISIT (OUTPATIENT)
Dept: PHYSICAL THERAPY | Facility: HOSPITAL | Age: 78
End: 2018-10-04
Attending: PHYSICAL MEDICINE & REHABILITATION
Payer: MEDICARE

## 2018-10-04 PROCEDURE — 97140 MANUAL THERAPY 1/> REGIONS: CPT | Performed by: PHYSICAL THERAPIST

## 2018-10-04 PROCEDURE — 97110 THERAPEUTIC EXERCISES: CPT | Performed by: PHYSICAL THERAPIST

## 2018-10-04 NOTE — PROGRESS NOTES
10/4/2018  Dx: Cervical disc disease (M50.90)  Other spondylosis, cervical region (G04.170)  Bilateral neck pain (M54.2)            Authorized # of Visits: 8/10 [facility] Medicare, Gerry Hoang         Next MD visit: none   Fall Risk: standard         Precautions: against the wall    Ball push downs in sitting to elongate the trunk with exhalation Ball raises OH in semi-reclined position    Scapular retraction in supine with arms outstretched    Chin tuck in semi-reclined position  Chin tucks    Thoracic extension t sitting. 3.  The pt will be able to stand with upright posture against the wall to improve trunk control. 4.  The pt will be independent in her HEP. NET 10/4/2018  5. The pt will be able to look down and read for 30 minutes without an increase in pain. States she wakes up in the night both from pain and to go to the backroom. Reports her 4th finger on the R intermittently curls but she can straighten it with her other hand. States the symptoms worsened after stopping therapy the last time.   Reports she right mod-severe lateral recess stenosis   Noted: 10/16/2017   bilateral L3-4 and S1 radiculopathies   Noted: 10/16/2017   Chronic left-sided low back pain with left-sided sciatica   Noted: 10/16/2017   Chronic left-sided thoracic back pain   Noted: 10/16/ behind back         Strength UE:   5/5 MMT Scale   R  L   Shoulder flex       4- 4+   Shoulder ext NT NT   Abduction (C5) 4- 4+   ER NT NT   IR NT NT   Biceps (C6) 5 5   Wrist ext (C6) 5 5   Triceps (C7) 5 5   Wrist flex (C7) 5 5   EPL (C8)  5 5   Inteross

## 2018-10-11 ENCOUNTER — APPOINTMENT (OUTPATIENT)
Dept: PHYSICAL THERAPY | Facility: HOSPITAL | Age: 78
End: 2018-10-11
Attending: PHYSICAL MEDICINE & REHABILITATION
Payer: MEDICARE

## 2018-10-11 ENCOUNTER — TELEPHONE (OUTPATIENT)
Dept: PHYSICAL THERAPY | Facility: HOSPITAL | Age: 78
End: 2018-10-11

## 2018-10-15 ENCOUNTER — APPOINTMENT (OUTPATIENT)
Dept: PHYSICAL THERAPY | Facility: HOSPITAL | Age: 78
End: 2018-10-15
Attending: PHYSICAL MEDICINE & REHABILITATION
Payer: MEDICARE

## 2018-10-25 ENCOUNTER — OFFICE VISIT (OUTPATIENT)
Dept: PHYSICAL THERAPY | Facility: HOSPITAL | Age: 78
End: 2018-10-25
Attending: PHYSICAL MEDICINE & REHABILITATION
Payer: MEDICARE

## 2018-10-25 PROCEDURE — 97110 THERAPEUTIC EXERCISES: CPT | Performed by: PHYSICAL THERAPIST

## 2018-10-25 PROCEDURE — 97140 MANUAL THERAPY 1/> REGIONS: CPT | Performed by: PHYSICAL THERAPIST

## 2018-10-25 NOTE — PROGRESS NOTES
10/25/2018    Patient Name: Shayla Farris  YOB: 1940          MRN number:  [id number]  Referring Physician:  Ravi Hugo    Progress Summary    Pt has attended 9, cancelled 2, and no shown 0 visits in Physical Therapy.      Dx: Cervical di extension with exhalation in sitting decompression exercise    Chin tucks in elevated supine   Therapeutic Activity      Neuromuscular Education Education on sleep hygiene     HEP          Education: Patient was educated on the concept of pain as an output 10/25/2018  3. The pt will be able to stand with upright posture against the wall to improve trunk control. PROGRESSING 10/25/2018  4. The pt will be independent in her HEP. NET 10/4/2018  5.   The pt will be able to look down and read for 30 minutes with Date____________________    Certification From: 69/24/2035  To:1/23/2019

## 2018-11-01 ENCOUNTER — OFFICE VISIT (OUTPATIENT)
Dept: PHYSICAL THERAPY | Facility: HOSPITAL | Age: 78
End: 2018-11-01
Attending: PHYSICAL MEDICINE & REHABILITATION
Payer: MEDICARE

## 2018-11-01 ENCOUNTER — OFFICE VISIT (OUTPATIENT)
Dept: NEUROLOGY | Facility: CLINIC | Age: 78
End: 2018-11-01
Payer: COMMERCIAL

## 2018-11-01 VITALS — RESPIRATION RATE: 14 BRPM | SYSTOLIC BLOOD PRESSURE: 150 MMHG | DIASTOLIC BLOOD PRESSURE: 78 MMHG | HEART RATE: 70 BPM

## 2018-11-01 DIAGNOSIS — M65.341 TRIGGER RING FINGER OF RIGHT HAND: ICD-10-CM

## 2018-11-01 DIAGNOSIS — M50.90 CERVICAL DISC DISEASE: ICD-10-CM

## 2018-11-01 DIAGNOSIS — G60.9 IDIOPATHIC PERIPHERAL NEUROPATHY: Primary | ICD-10-CM

## 2018-11-01 DIAGNOSIS — M62.89 PELVIC FLOOR DYSFUNCTION IN FEMALE: ICD-10-CM

## 2018-11-01 DIAGNOSIS — M47.892 OTHER SPONDYLOSIS, CERVICAL REGION: ICD-10-CM

## 2018-11-01 PROBLEM — K62.89 RECTAL PAIN: Status: ACTIVE | Noted: 2018-11-01

## 2018-11-01 PROCEDURE — 97112 NEUROMUSCULAR REEDUCATION: CPT | Performed by: PHYSICAL THERAPIST

## 2018-11-01 PROCEDURE — 97140 MANUAL THERAPY 1/> REGIONS: CPT | Performed by: PHYSICAL THERAPIST

## 2018-11-01 PROCEDURE — 99214 OFFICE O/P EST MOD 30 MIN: CPT | Performed by: PHYSICAL MEDICINE & REHABILITATION

## 2018-11-01 RX ORDER — SUCRALFATE 1 G/1
1 TABLET ORAL
COMMUNITY
Start: 2011-05-17 | End: 2018-11-01

## 2018-11-01 NOTE — PATIENT INSTRUCTIONS
As of October 6th 2014, the Drug Enforcement Agency [facility]) is reclassifying all hydrocodone combination medications from Schedule III to Schedule II. This includes medications such as Norco, Vicodin, Lortab, Zohydro, and Vicoprofen.     What this means for y patient will continue with PT. The patient will continue with her home exercise program.    She would like to hold on have a right 4th finger trigger finger injection for now. The patient does not need any pain medications at this time.     She will f

## 2018-11-01 NOTE — PROGRESS NOTES
11/1/2018    Patient Name: Adri Hernandez  YOB: 1940          MRN number:  [id number]  Referring Physician:  Kelsey Garcia      Dx: Cervical disc disease (M50.90)  Other spondylosis, cervical region (M47.892)  Bilateral neck pain (M54.2) Activity       Neuromuscular Education Education on sleep hygiene   diaphragamatic breathing   HEP           Education: Patient was educated on the concept of pain as an output of the brain, including nociception versus pain,inhibition and facilitation, an Therapeutic Activity; Patient education; Home exercise program instruction; TNE Education, Modalities as needed.     Education or treatment limitation: None  Rehab Potential: good      FOTO: 66% limitation currently, 51% predicted, 10/25/2018 56% limitation

## 2018-11-01 NOTE — PROGRESS NOTES
Cervical Pain H & P    Chief Complaint:  Patient presents with:  Low Back Pain: pt here for follow up with c/o low back pain that is localized. pt has burning sensation in rectum and vagina x2 days, denies having uti.  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   • Colon polyps    • Deviated septum    • Esophageal reflux    • Essential hypertension    • Foot fracture     RIGHT   • Head concussion 05/06/2014   • Head concussion 2009   • History of hiat Concerns:         Service: Not Asked        Blood Transfusions: Not Asked        Caffeine Concern: Yes          1 cup of coffee daily         Occupational Exposure: Not Asked        Hobby Hazards: Not Asked        Sleep Concern: Not Asked        St except:  Shoulder external rotators Right: 4/5  Shoulder external rotators Left: 4/5  Shoulder internal rotator Right: 4+/5  Shoulder internal rotator Left: 4+/5  Serratus anterior Right: 3+/5  Serratus anterior Left: 4-/5   Reflexes: 2+ In the bilateral u

## 2018-11-05 ENCOUNTER — OFFICE VISIT (OUTPATIENT)
Dept: PHYSICAL THERAPY | Facility: HOSPITAL | Age: 78
End: 2018-11-05
Attending: PHYSICAL MEDICINE & REHABILITATION
Payer: MEDICARE

## 2018-11-05 PROCEDURE — 97140 MANUAL THERAPY 1/> REGIONS: CPT | Performed by: PHYSICAL THERAPIST

## 2018-11-05 PROCEDURE — 97112 NEUROMUSCULAR REEDUCATION: CPT | Performed by: PHYSICAL THERAPIST

## 2018-11-05 NOTE — PROGRESS NOTES
11/5/2018    Patient Name: Karina Albarran  YOB: 1940          MRN number:  [id number]  Referring Physician:  Manjit Parisi      Dx: Cervical disc disease (M50.90)  Other spondylosis, cervical region (M47.892)  Bilateral neck pain (M54.2) release technique    Rib depression with IR - manually   Therapeutic Exercise  Decompression  Exercise    Shoulder extension with exhalation in sitting decompression exercise    Chin tucks in elevated supine decompression exercise Walker push ups with exha 10/4/2018  5. The pt will be able to look down and read for 30 minutes without an increase in pain. PROGRESSING 10/25/2018  6. The pt will be able to reach [facility] without an increase in pain.   Added 10/25/2018    Frequency/Duration: Patient will be seen for

## 2018-11-13 ENCOUNTER — OFFICE VISIT (OUTPATIENT)
Dept: PHYSICAL THERAPY | Facility: HOSPITAL | Age: 78
End: 2018-11-13
Attending: PHYSICAL MEDICINE & REHABILITATION
Payer: MEDICARE

## 2018-11-13 PROCEDURE — 97110 THERAPEUTIC EXERCISES: CPT | Performed by: PHYSICAL THERAPIST

## 2018-11-13 PROCEDURE — 97140 MANUAL THERAPY 1/> REGIONS: CPT | Performed by: PHYSICAL THERAPIST

## 2018-11-13 NOTE — PROGRESS NOTES
11/13/2018    Patient Name: Lakesha Rosenberg  YOB: 1940          MRN number:  [id number]  Referring Physician:  Jenn Nava      Dx: Cervical disc disease (M50.90)  Other spondylosis, cervical region (M47.892)  Bilateral neck pain (M54.2) Exercise  Decompression  Exercise    Shoulder extension with exhalation in sitting decompression exercise    Chin tucks in elevated supine decompression exercise Walker push ups with exhalation  Walker push ups with exhalation    Hip extension holding onto The pt will be able to stand with upright posture against the wall to improve trunk control. PROGRESSING 10/25/2018  4. The pt will be independent in her HEP. NET 10/4/2018  5.   The pt will be able to look down and read for 30 minutes without an increase

## 2018-11-26 ENCOUNTER — APPOINTMENT (OUTPATIENT)
Dept: PHYSICAL THERAPY | Facility: HOSPITAL | Age: 78
End: 2018-11-26
Attending: PHYSICAL MEDICINE & REHABILITATION
Payer: MEDICARE

## 2018-11-27 ENCOUNTER — APPOINTMENT (OUTPATIENT)
Dept: PHYSICAL THERAPY | Facility: HOSPITAL | Age: 78
End: 2018-11-27
Attending: PHYSICAL MEDICINE & REHABILITATION
Payer: MEDICARE

## 2018-11-29 ENCOUNTER — OFFICE VISIT (OUTPATIENT)
Dept: PHYSICAL THERAPY | Facility: HOSPITAL | Age: 78
End: 2018-11-29
Attending: PHYSICAL MEDICINE & REHABILITATION
Payer: MEDICARE

## 2018-11-29 PROCEDURE — 97140 MANUAL THERAPY 1/> REGIONS: CPT | Performed by: PHYSICAL THERAPIST

## 2018-11-29 PROCEDURE — 97112 NEUROMUSCULAR REEDUCATION: CPT | Performed by: PHYSICAL THERAPIST

## 2018-11-29 NOTE — PROGRESS NOTES
11/29/2018    Patient Name: Idris Barry  YOB: 1940          MRN number:  [id number]  Referring Physician:  Andie Toribio      Dx: Cervical disc disease (M50.90)  Other spondylosis, cervical region (M47.892)  Bilateral neck pain (M54.2) exhalation    Sitting adductor pull backs    Serratus push up on wall with exhalation   HEP   Walker push ups with exhalation    Hip extension holding onto walker    Hip abduction holding onto walker    Chin tucks    Sit to stand from chair holding ball Si increase in pain. Added 10/25/2018    Frequency/Duration: Patient will be seen for 1-2 x/week or a total of 10 visits over a 90 day period. Treatment will include: Manual Therapy; Therapeutic Exercises; Neuromuscular Re-education; Therapeutic Activity;  Pa

## 2018-11-30 ENCOUNTER — APPOINTMENT (OUTPATIENT)
Dept: PHYSICAL THERAPY | Facility: HOSPITAL | Age: 78
End: 2018-11-30
Attending: PHYSICAL MEDICINE & REHABILITATION
Payer: MEDICARE

## 2018-12-03 ENCOUNTER — APPOINTMENT (OUTPATIENT)
Dept: PHYSICAL THERAPY | Facility: HOSPITAL | Age: 78
End: 2018-12-03
Attending: PHYSICAL MEDICINE & REHABILITATION
Payer: MEDICARE

## 2018-12-04 ENCOUNTER — APPOINTMENT (OUTPATIENT)
Dept: PHYSICAL THERAPY | Facility: HOSPITAL | Age: 78
End: 2018-12-04
Attending: PHYSICAL MEDICINE & REHABILITATION
Payer: MEDICARE

## 2018-12-13 ENCOUNTER — OFFICE VISIT (OUTPATIENT)
Dept: PHYSICAL THERAPY | Facility: HOSPITAL | Age: 78
End: 2018-12-13
Attending: PHYSICAL MEDICINE & REHABILITATION
Payer: MEDICARE

## 2018-12-13 PROCEDURE — 97112 NEUROMUSCULAR REEDUCATION: CPT | Performed by: PHYSICAL THERAPIST

## 2018-12-13 NOTE — PROGRESS NOTES
12/13/2018      Patient Name: Rubens Mejia  YOB: 1940          MRN number:  [id number]  Referring Physician:  Ardie Angelucci      Dx: Cervical disc disease (M50.90)  Other spondylosis, cervical region (M47.892)  Bilateral neck pain (M54.2) backs    Serratus push up on wall with exhalation Paraspinal release with L hamstring in sitting    Standing wall press    Seated L adductor pull back with posterior mediastinum expansion    Walking stick training   HEP   Walker push ups with exhalation her HEP. NET 10/4/2018  5. The pt will be able to look down and read for 30 minutes without an increase in pain. PROGRESSING 10/25/2018  6. The pt will be able to reach [facility] without an increase in pain.   Added 10/25/2018    Frequency/Duration: Patient casandra

## 2018-12-14 ENCOUNTER — APPOINTMENT (OUTPATIENT)
Dept: PHYSICAL THERAPY | Facility: HOSPITAL | Age: 78
End: 2018-12-14
Attending: PHYSICAL MEDICINE & REHABILITATION
Payer: MEDICARE

## 2018-12-17 ENCOUNTER — APPOINTMENT (OUTPATIENT)
Dept: PHYSICAL THERAPY | Facility: HOSPITAL | Age: 78
End: 2018-12-17
Attending: PHYSICAL MEDICINE & REHABILITATION
Payer: MEDICARE

## 2018-12-18 ENCOUNTER — OFFICE VISIT (OUTPATIENT)
Dept: PHYSICAL THERAPY | Facility: HOSPITAL | Age: 78
End: 2018-12-18
Attending: PHYSICAL MEDICINE & REHABILITATION
Payer: MEDICARE

## 2018-12-18 PROCEDURE — 97112 NEUROMUSCULAR REEDUCATION: CPT | Performed by: PHYSICAL THERAPIST

## 2018-12-18 PROCEDURE — 97530 THERAPEUTIC ACTIVITIES: CPT | Performed by: PHYSICAL THERAPIST

## 2018-12-18 NOTE — PROGRESS NOTES
12/18/2018    Patient Name: Radha Castillo  YOB: 1940          MRN number:  [id number]  Referring Physician:  Isak Adrian      Dx: Cervical disc disease (M50.90)  Other spondylosis, cervical region (M47.892)  Bilateral neck pain (M54.2) walker    Hip abduction holding onto walker    Chin tucks    Sit to stand from chair holding ball              Therapeutic Activity      Attempted to lie supine with hips/knees supported at 90/90   Neuromuscular Education diaphragamatic breathing   Hip add compliance. The pt had difficulty tolerating the 90/90 position in supine with her head supported and therefore was no able to tolerate exercises in that position. The pt is also unable to tolerate side lying on either side. Goals:   1.   Centralizatio

## 2018-12-27 ENCOUNTER — APPOINTMENT (OUTPATIENT)
Dept: PHYSICAL THERAPY | Facility: HOSPITAL | Age: 78
End: 2018-12-27
Attending: PHYSICAL MEDICINE & REHABILITATION
Payer: MEDICARE

## 2018-12-28 ENCOUNTER — OFFICE VISIT (OUTPATIENT)
Dept: PHYSICAL THERAPY | Facility: HOSPITAL | Age: 78
End: 2018-12-28
Attending: PHYSICAL MEDICINE & REHABILITATION
Payer: MEDICARE

## 2018-12-28 PROCEDURE — 97112 NEUROMUSCULAR REEDUCATION: CPT | Performed by: PHYSICAL THERAPIST

## 2018-12-28 NOTE — PROGRESS NOTES
12/28/2018    DISCHARGE SUMMARY    Patient Name: Nicole Hanley  YOB: 1940          MRN number:  [id number]  Referring Physician:  Juan Pablo Ramos      Dx: Cervical disc disease (M50.90)  Other spondylosis, cervical region (C52.541)  Bilateral in sitting with arms supported   Bilateral iliopsoas release    BC manual techniques  1. L sternal  2.  R intercostal stretch  3.   L pec stretch  4.  R subclavious       Therapeutic Exercise decompression exercise Walker push ups with exhalation  Walker p resulting in hypersensitive nerves. Metaphors incorporated to foster deep learning and paradigm shift for patient.      Education  Patient educated on the concept of  the nervous system as the bodies alarm system, and the role of  nociception to warn the soren 10/25/2018  To:1/23/2019

## 2019-02-25 ENCOUNTER — LAB REQUISITION (OUTPATIENT)
Dept: LAB | Facility: HOSPITAL | Age: 79
End: 2019-02-25
Payer: MEDICARE

## 2019-02-25 DIAGNOSIS — R73.01 IMPAIRED FASTING GLUCOSE: ICD-10-CM

## 2019-02-25 DIAGNOSIS — E78.00 PURE HYPERCHOLESTEROLEMIA: ICD-10-CM

## 2019-02-25 LAB
ALBUMIN SERPL-MCNC: 4 G/DL (ref 3.4–5)
ALBUMIN/GLOB SERPL: 1.1 {RATIO} (ref 1–2)
ALP LIVER SERPL-CCNC: 90 U/L (ref 55–142)
ALT SERPL-CCNC: 26 U/L (ref 13–56)
ANION GAP SERPL CALC-SCNC: 6 MMOL/L (ref 0–18)
AST SERPL-CCNC: 19 U/L (ref 15–37)
BASOPHILS # BLD AUTO: 0.02 X10(3) UL (ref 0–0.2)
BASOPHILS NFR BLD AUTO: 0.3 %
BILIRUB SERPL-MCNC: 1.2 MG/DL (ref 0.1–2)
BUN BLD-MCNC: 15 MG/DL (ref 7–18)
BUN/CREAT SERPL: 20.8 (ref 10–20)
CALCIUM BLD-MCNC: 10.2 MG/DL (ref 8.5–10.1)
CHLORIDE SERPL-SCNC: 107 MMOL/L (ref 98–107)
CHOLEST SMN-MCNC: 157 MG/DL (ref ?–200)
CO2 SERPL-SCNC: 28 MMOL/L (ref 21–32)
CREAT BLD-MCNC: 0.72 MG/DL (ref 0.55–1.02)
DEPRECATED RDW RBC AUTO: 47.1 FL (ref 35.1–46.3)
EOSINOPHIL # BLD AUTO: 0.07 X10(3) UL (ref 0–0.7)
EOSINOPHIL NFR BLD AUTO: 0.9 %
ERYTHROCYTE [DISTWIDTH] IN BLOOD BY AUTOMATED COUNT: 14.3 % (ref 11–15)
EST. AVERAGE GLUCOSE BLD GHB EST-MCNC: 120 MG/DL (ref 68–126)
GLOBULIN PLAS-MCNC: 3.6 G/DL (ref 2.8–4.4)
GLUCOSE BLD-MCNC: 98 MG/DL (ref 70–99)
HBA1C MFR BLD HPLC: 5.8 % (ref ?–5.7)
HCT VFR BLD AUTO: 44.2 % (ref 35–48)
HDLC SERPL-MCNC: 76 MG/DL (ref 40–59)
HGB BLD-MCNC: 14.5 G/DL (ref 12–16)
IMM GRANULOCYTES # BLD AUTO: 0.02 X10(3) UL (ref 0–1)
IMM GRANULOCYTES NFR BLD: 0.3 %
LDLC SERPL CALC-MCNC: 66 MG/DL (ref ?–100)
LYMPHOCYTES # BLD AUTO: 2.3 X10(3) UL (ref 1–4)
LYMPHOCYTES NFR BLD AUTO: 30.8 %
M PROTEIN MFR SERPL ELPH: 7.6 G/DL (ref 6.4–8.2)
MCH RBC QN AUTO: 29.7 PG (ref 26–34)
MCHC RBC AUTO-ENTMCNC: 32.8 G/DL (ref 31–37)
MCV RBC AUTO: 90.4 FL (ref 80–100)
MONOCYTES # BLD AUTO: 0.35 X10(3) UL (ref 0.1–1)
MONOCYTES NFR BLD AUTO: 4.7 %
NEUTROPHILS # BLD AUTO: 4.71 X10 (3) UL (ref 1.5–7.7)
NEUTROPHILS # BLD AUTO: 4.71 X10(3) UL (ref 1.5–7.7)
NEUTROPHILS NFR BLD AUTO: 63 %
NONHDLC SERPL-MCNC: 81 MG/DL (ref ?–130)
OSMOLALITY SERPL CALC.SUM OF ELEC: 293 MOSM/KG (ref 275–295)
PLATELET # BLD AUTO: 338 10(3)UL (ref 150–450)
POTASSIUM SERPL-SCNC: 4.1 MMOL/L (ref 3.5–5.1)
RBC # BLD AUTO: 4.89 X10(6)UL (ref 3.8–5.3)
SODIUM SERPL-SCNC: 141 MMOL/L (ref 136–145)
TRIGL SERPL-MCNC: 75 MG/DL (ref 30–149)
TSI SER-ACNC: 1.57 MIU/ML (ref 0.36–3.74)
VLDLC SERPL CALC-MCNC: 15 MG/DL (ref 0–30)
WBC # BLD AUTO: 7.5 X10(3) UL (ref 4–11)

## 2019-02-25 PROCEDURE — 80061 LIPID PANEL: CPT | Performed by: INTERNAL MEDICINE

## 2019-02-25 PROCEDURE — 84443 ASSAY THYROID STIM HORMONE: CPT | Performed by: INTERNAL MEDICINE

## 2019-02-25 PROCEDURE — 85025 COMPLETE CBC W/AUTO DIFF WBC: CPT | Performed by: INTERNAL MEDICINE

## 2019-02-25 PROCEDURE — 83036 HEMOGLOBIN GLYCOSYLATED A1C: CPT | Performed by: INTERNAL MEDICINE

## 2019-02-25 PROCEDURE — 80053 COMPREHEN METABOLIC PANEL: CPT | Performed by: INTERNAL MEDICINE

## 2019-04-11 ENCOUNTER — HOSPITAL ENCOUNTER (EMERGENCY)
Facility: HOSPITAL | Age: 79
Discharge: HOME | End: 2019-04-11
Attending: EMERGENCY MEDICINE
Payer: MEDICARE

## 2019-04-11 ENCOUNTER — APPOINTMENT (OUTPATIENT)
Dept: GENERAL RADIOLOGY | Facility: HOSPITAL | Age: 79
End: 2019-04-11
Attending: EMERGENCY MEDICINE
Payer: MEDICARE

## 2019-04-11 VITALS
TEMPERATURE: 98 F | OXYGEN SATURATION: 95 % | HEART RATE: 78 BPM | WEIGHT: 170 LBS | RESPIRATION RATE: 18 BRPM | HEIGHT: 55 IN | BODY MASS INDEX: 39.34 KG/M2 | DIASTOLIC BLOOD PRESSURE: 48 MMHG | SYSTOLIC BLOOD PRESSURE: 105 MMHG

## 2019-04-11 DIAGNOSIS — R07.89 CHEST PAIN, ATYPICAL: Primary | ICD-10-CM

## 2019-04-11 PROCEDURE — 99285 EMERGENCY DEPT VISIT HI MDM: CPT

## 2019-04-11 PROCEDURE — 96375 TX/PRO/DX INJ NEW DRUG ADDON: CPT

## 2019-04-11 PROCEDURE — 84484 ASSAY OF TROPONIN QUANT: CPT | Performed by: EMERGENCY MEDICINE

## 2019-04-11 PROCEDURE — 93010 ELECTROCARDIOGRAM REPORT: CPT | Performed by: EMERGENCY MEDICINE

## 2019-04-11 PROCEDURE — 93005 ELECTROCARDIOGRAM TRACING: CPT

## 2019-04-11 PROCEDURE — 80048 BASIC METABOLIC PNL TOTAL CA: CPT | Performed by: EMERGENCY MEDICINE

## 2019-04-11 PROCEDURE — 96374 THER/PROPH/DIAG INJ IV PUSH: CPT

## 2019-04-11 PROCEDURE — 71045 X-RAY EXAM CHEST 1 VIEW: CPT | Performed by: EMERGENCY MEDICINE

## 2019-04-11 PROCEDURE — 85025 COMPLETE CBC W/AUTO DIFF WBC: CPT | Performed by: EMERGENCY MEDICINE

## 2019-04-11 RX ORDER — KETOROLAC TROMETHAMINE 30 MG/ML
15 INJECTION, SOLUTION INTRAMUSCULAR; INTRAVENOUS ONCE
Status: COMPLETED | OUTPATIENT
Start: 2019-04-11 | End: 2019-04-11

## 2019-04-11 RX ORDER — ONDANSETRON 2 MG/ML
4 INJECTION INTRAMUSCULAR; INTRAVENOUS ONCE
Status: COMPLETED | OUTPATIENT
Start: 2019-04-11 | End: 2019-04-11

## 2019-04-11 NOTE — ED PROVIDER NOTES
Patient Seen in: [facility] Emergency Department    History   Patient presents with:  Chest Pain Angina (cardiovascular)    Stated Complaint: chest pain    HPI    Patient presents to the emergency department currently accompanied by her  an Medications :   Triamterene-HCTZ 37.5-25 MG Oral Cap,  Take 1 capsule by mouth daily. Cholecalciferol (VITAMIN D) 1000 units Oral Tab,  Take by mouth daily.    acetaminophen (TYLENOL EXTRA STRENGTH) 500 MG Oral Tab,  Take 500 mg by mouth every 6 (si 77.1 kg   SpO2 95%   BMI 39.51 kg/m²         Physical Exam  Constitutional:  Alert, well nourished adult lying in bed in no distress. Vital signs noted. HEENT: Oropharynx is moist without erythema or exudates  Eye:  No scleral icterus.   Eyelids appear no comfortable with discharge she will return if worse.         ED Course     Labs Reviewed   BASIC METABOLIC PANEL (8) - Abnormal; Notable for the following components:       Result Value    Glucose 101 (*)     All other components within normal limits   CBC

## 2019-05-01 ENCOUNTER — OFFICE VISIT (OUTPATIENT)
Dept: NEUROLOGY | Facility: CLINIC | Age: 79
End: 2019-05-01
Payer: COMMERCIAL

## 2019-05-01 ENCOUNTER — TELEPHONE (OUTPATIENT)
Dept: NEUROLOGY | Facility: CLINIC | Age: 79
End: 2019-05-01

## 2019-05-01 VITALS
HEART RATE: 80 BPM | WEIGHT: 170 LBS | HEIGHT: 55 IN | BODY MASS INDEX: 39.34 KG/M2 | DIASTOLIC BLOOD PRESSURE: 82 MMHG | RESPIRATION RATE: 16 BRPM | SYSTOLIC BLOOD PRESSURE: 136 MMHG

## 2019-05-01 DIAGNOSIS — M50.90 CERVICAL DISC DISEASE: ICD-10-CM

## 2019-05-01 DIAGNOSIS — G89.29 CHRONIC LEFT-SIDED LOW BACK PAIN WITH BILATERAL SCIATICA: ICD-10-CM

## 2019-05-01 DIAGNOSIS — M72.2 PLANTAR FASCIITIS, BILATERAL: ICD-10-CM

## 2019-05-01 DIAGNOSIS — M65.342 TRIGGER RING FINGER OF LEFT HAND: ICD-10-CM

## 2019-05-01 DIAGNOSIS — M54.2 BILATERAL NECK PAIN: ICD-10-CM

## 2019-05-01 DIAGNOSIS — M48.061 LUMBAR FORAMINAL STENOSIS: ICD-10-CM

## 2019-05-01 DIAGNOSIS — M65.341 TRIGGER RING FINGER OF RIGHT HAND: ICD-10-CM

## 2019-05-01 DIAGNOSIS — M54.42 CHRONIC LEFT-SIDED LOW BACK PAIN WITH BILATERAL SCIATICA: ICD-10-CM

## 2019-05-01 DIAGNOSIS — M54.16 LUMBAR RADICULOPATHY: Primary | ICD-10-CM

## 2019-05-01 DIAGNOSIS — M48.062 SPINAL STENOSIS OF LUMBAR REGION WITH NEUROGENIC CLAUDICATION: ICD-10-CM

## 2019-05-01 DIAGNOSIS — M51.9 LUMBAR DISC DISEASE: ICD-10-CM

## 2019-05-01 DIAGNOSIS — M54.81 BILATERAL OCCIPITAL NEURALGIA: ICD-10-CM

## 2019-05-01 DIAGNOSIS — M54.41 CHRONIC LEFT-SIDED LOW BACK PAIN WITH BILATERAL SCIATICA: ICD-10-CM

## 2019-05-01 DIAGNOSIS — M43.16 SPONDYLOLISTHESIS OF LUMBAR REGION: ICD-10-CM

## 2019-05-01 PROBLEM — R47.1 DYSARTHRIA: Status: ACTIVE | Noted: 2019-05-01

## 2019-05-01 PROCEDURE — 99214 OFFICE O/P EST MOD 30 MIN: CPT | Performed by: PHYSICAL MEDICINE & REHABILITATION

## 2019-05-01 NOTE — PATIENT INSTRUCTIONS
Plan  She will get into the PT for the lumbar spine and her plantar fasciitis. She will resume her speech exercises. I will do bilateral 4th finger trigger finger injections in the future.     She will continue with the Tylenol as needed for the hernan

## 2019-05-01 NOTE — PROGRESS NOTES
Low Back Pain H & P    Chief Complaint: Patient presents with:  Low Back Pain: Patient presents fro follow up on low back pain, LOV:11/1/18. States her pain is manageable, only has pain when lifting.  Does not have any pain at this time but would like to ge • OTHER SURGICAL HISTORY      hiatal hernia repair   • OTHER SURGICAL HISTORY  1988    DEVIATED SEPTUM    • SHOULDER SURG PROC UNLISTED      Right   • TONSILLECTOMY  35       Family History   Family History   Problem Relation Age of Onset   • Breast Ca on file    Other Topics      Concerns:         Service: Not Asked        Blood Transfusions: Not Asked        Caffeine Concern: Yes          1 cup of coffee daily         Occupational Exposure: Not Asked        Hobby Hazards: Not Asked        Sleep neurtral.    bilateral Hand:  There is palpable nodule over the bilateral 4th MCP joints in the areas of the A1 pulleys. These nodules are painful on palpation.   With finger flexion there is a catching of the 4th digit in flexion at the PIP and DIP joints

## 2019-05-02 NOTE — TELEPHONE ENCOUNTER
Right 4th finger flexor tendon sheath injection at the site of the A1 pulley under ultrasound guidance cpt [id number], [id number], . [facility] Online Notification Prior Authorization Procedure Code Inquiry. No authorization is required.   No Reference number pro

## 2019-08-06 ENCOUNTER — OFFICE VISIT (OUTPATIENT)
Dept: NEUROLOGY | Facility: CLINIC | Age: 79
End: 2019-08-06
Payer: COMMERCIAL

## 2019-08-06 VITALS — DIASTOLIC BLOOD PRESSURE: 70 MMHG | HEART RATE: 68 BPM | RESPIRATION RATE: 14 BRPM | SYSTOLIC BLOOD PRESSURE: 114 MMHG

## 2019-08-06 DIAGNOSIS — M65.342 TRIGGER RING FINGER OF LEFT HAND: ICD-10-CM

## 2019-08-06 DIAGNOSIS — M65.341 TRIGGER RING FINGER OF RIGHT HAND: Primary | ICD-10-CM

## 2019-08-06 PROCEDURE — 20550 NJX 1 TENDON SHEATH/LIGAMENT: CPT | Performed by: PHYSICAL MEDICINE & REHABILITATION

## 2019-08-06 PROCEDURE — 76942 ECHO GUIDE FOR BIOPSY: CPT | Performed by: PHYSICAL MEDICINE & REHABILITATION

## 2019-08-06 RX ORDER — TRIAMCINOLONE ACETONIDE 40 MG/ML
20 INJECTION, SUSPENSION INTRA-ARTICULAR; INTRAMUSCULAR ONCE
Status: COMPLETED | OUTPATIENT
Start: 2019-08-06 | End: 2019-08-06

## 2019-08-06 RX ORDER — LIDOCAINE HYDROCHLORIDE 10 MG/ML
0.5 INJECTION, SOLUTION INFILTRATION; PERINEURAL ONCE
Status: COMPLETED | OUTPATIENT
Start: 2019-08-06 | End: 2019-08-06

## 2019-08-07 NOTE — PROCEDURES
I did a bilateral 4th th finger trigger finger/flexor digitorum superficialis tendon sheath injections in the office under ultrasound guidance at the level of the A1 pulley.   Under ultrasound guidance the bilateral 4th flexor digitorum superficialis tendon

## 2019-08-08 ENCOUNTER — MED REC SCAN ONLY (OUTPATIENT)
Dept: NEUROLOGY | Facility: CLINIC | Age: 79
End: 2019-08-08

## 2019-08-29 ENCOUNTER — HOSPITAL ENCOUNTER (EMERGENCY)
Facility: HOSPITAL | Age: 79
Discharge: HOME | End: 2019-08-29
Attending: EMERGENCY MEDICINE
Payer: MEDICARE

## 2019-08-29 ENCOUNTER — OFFICE VISIT (OUTPATIENT)
Dept: NEUROLOGY | Facility: CLINIC | Age: 79
End: 2019-08-29
Payer: COMMERCIAL

## 2019-08-29 ENCOUNTER — APPOINTMENT (OUTPATIENT)
Dept: GENERAL RADIOLOGY | Facility: HOSPITAL | Age: 79
End: 2019-08-29
Attending: EMERGENCY MEDICINE
Payer: MEDICARE

## 2019-08-29 VITALS
BODY MASS INDEX: 40.27 KG/M2 | OXYGEN SATURATION: 97 % | WEIGHT: 174 LBS | TEMPERATURE: 98 F | DIASTOLIC BLOOD PRESSURE: 67 MMHG | RESPIRATION RATE: 16 BRPM | HEART RATE: 69 BPM | SYSTOLIC BLOOD PRESSURE: 135 MMHG | HEIGHT: 55 IN

## 2019-08-29 VITALS — SYSTOLIC BLOOD PRESSURE: 150 MMHG | HEART RATE: 60 BPM | DIASTOLIC BLOOD PRESSURE: 86 MMHG | RESPIRATION RATE: 14 BRPM

## 2019-08-29 DIAGNOSIS — R07.9 LEFT-SIDED CHEST PAIN: Primary | ICD-10-CM

## 2019-08-29 DIAGNOSIS — M65.342 TRIGGER RING FINGER OF LEFT HAND: ICD-10-CM

## 2019-08-29 DIAGNOSIS — R07.89 CHEST PAIN, ATYPICAL: Primary | ICD-10-CM

## 2019-08-29 LAB
ALBUMIN SERPL-MCNC: 4.1 G/DL (ref 3.4–5)
ALP LIVER SERPL-CCNC: 98 U/L (ref 55–142)
ALT SERPL-CCNC: 25 U/L (ref 13–56)
ANION GAP SERPL CALC-SCNC: 5 MMOL/L (ref 0–18)
AST SERPL-CCNC: 15 U/L (ref 15–37)
BASOPHILS # BLD AUTO: 0.03 X10(3) UL (ref 0–0.2)
BASOPHILS NFR BLD AUTO: 0.4 %
BILIRUB DIRECT SERPL-MCNC: 0.3 MG/DL (ref 0–0.2)
BILIRUB SERPL-MCNC: 1.7 MG/DL (ref 0.1–2)
BUN BLD-MCNC: 12 MG/DL (ref 7–18)
BUN/CREAT SERPL: 16.4 (ref 10–20)
CALCIUM BLD-MCNC: 9.5 MG/DL (ref 8.5–10.1)
CHLORIDE SERPL-SCNC: 108 MMOL/L (ref 98–112)
CO2 SERPL-SCNC: 28 MMOL/L (ref 21–32)
CREAT BLD-MCNC: 0.73 MG/DL (ref 0.55–1.02)
DEPRECATED RDW RBC AUTO: 48.6 FL (ref 35.1–46.3)
EOSINOPHIL # BLD AUTO: 0.11 X10(3) UL (ref 0–0.7)
EOSINOPHIL NFR BLD AUTO: 1.5 %
ERYTHROCYTE [DISTWIDTH] IN BLOOD BY AUTOMATED COUNT: 14.4 % (ref 11–15)
GLUCOSE BLD-MCNC: 102 MG/DL (ref 70–99)
HCT VFR BLD AUTO: 47.3 % (ref 35–48)
HGB BLD-MCNC: 15.3 G/DL (ref 12–16)
IMM GRANULOCYTES # BLD AUTO: 0.03 X10(3) UL (ref 0–1)
IMM GRANULOCYTES NFR BLD: 0.4 %
LIPASE SERPL-CCNC: 163 U/L (ref 73–393)
LYMPHOCYTES # BLD AUTO: 2.29 X10(3) UL (ref 1–4)
LYMPHOCYTES NFR BLD AUTO: 31.8 %
M PROTEIN MFR SERPL ELPH: 7.9 G/DL (ref 6.4–8.2)
MCH RBC QN AUTO: 29.6 PG (ref 26–34)
MCHC RBC AUTO-ENTMCNC: 32.3 G/DL (ref 31–37)
MCV RBC AUTO: 91.5 FL (ref 80–100)
MONOCYTES # BLD AUTO: 0.47 X10(3) UL (ref 0.1–1)
MONOCYTES NFR BLD AUTO: 6.5 %
NEUTROPHILS # BLD AUTO: 4.27 X10 (3) UL (ref 1.5–7.7)
NEUTROPHILS # BLD AUTO: 4.27 X10(3) UL (ref 1.5–7.7)
NEUTROPHILS NFR BLD AUTO: 59.4 %
OSMOLALITY SERPL CALC.SUM OF ELEC: 292 MOSM/KG (ref 275–295)
PLATELET # BLD AUTO: 334 10(3)UL (ref 150–450)
POTASSIUM SERPL-SCNC: 4.1 MMOL/L (ref 3.5–5.1)
RBC # BLD AUTO: 5.17 X10(6)UL (ref 3.8–5.3)
SODIUM SERPL-SCNC: 141 MMOL/L (ref 136–145)
TROPONIN I SERPL-MCNC: <0.045 NG/ML (ref ?–0.04)
WBC # BLD AUTO: 7.2 X10(3) UL (ref 4–11)

## 2019-08-29 PROCEDURE — 93005 ELECTROCARDIOGRAM TRACING: CPT

## 2019-08-29 PROCEDURE — 83690 ASSAY OF LIPASE: CPT | Performed by: EMERGENCY MEDICINE

## 2019-08-29 PROCEDURE — 99284 EMERGENCY DEPT VISIT MOD MDM: CPT

## 2019-08-29 PROCEDURE — 84484 ASSAY OF TROPONIN QUANT: CPT | Performed by: EMERGENCY MEDICINE

## 2019-08-29 PROCEDURE — 93010 ELECTROCARDIOGRAM REPORT: CPT | Performed by: EMERGENCY MEDICINE

## 2019-08-29 PROCEDURE — 99213 OFFICE O/P EST LOW 20 MIN: CPT | Performed by: PHYSICAL MEDICINE & REHABILITATION

## 2019-08-29 PROCEDURE — 80076 HEPATIC FUNCTION PANEL: CPT | Performed by: EMERGENCY MEDICINE

## 2019-08-29 PROCEDURE — 36415 COLL VENOUS BLD VENIPUNCTURE: CPT

## 2019-08-29 PROCEDURE — 85025 COMPLETE CBC W/AUTO DIFF WBC: CPT | Performed by: EMERGENCY MEDICINE

## 2019-08-29 PROCEDURE — 80048 BASIC METABOLIC PNL TOTAL CA: CPT | Performed by: EMERGENCY MEDICINE

## 2019-08-29 PROCEDURE — 71045 X-RAY EXAM CHEST 1 VIEW: CPT | Performed by: EMERGENCY MEDICINE

## 2019-08-29 RX ORDER — ONDANSETRON 2 MG/ML
4 INJECTION INTRAMUSCULAR; INTRAVENOUS ONCE
Status: DISCONTINUED | OUTPATIENT
Start: 2019-08-29 | End: 2019-08-29

## 2019-08-29 NOTE — PROGRESS NOTES
Pain H & P    Chief Complaint:  Patient presents with:  Shoulder Pain: pt here for follow up after bilateral 4th finger trigger finger/flexor digitorum superficialis tendon sheath injections 8/6/19 with little relief.  pt continues to get \"locking in the l Socioeconomic History      Marital status:       Spouse name: Not on file      Number of children: Not on file      Years of education: Not on file      Highest education level: Not on file    Occupational History      Not on file    Social Needs cane.        The patient does live in a home with stairs. Review of Systems      PE: The patient does appear in her stated age in no distress. The patient is well groomed. Psychiatric:  The patient is alert and oriented x 3.   The patient has a no

## 2019-08-29 NOTE — ED INITIAL ASSESSMENT (HPI)
Pt sent in by pmd for eval of chest tightness and left shoulder discomfort, difficulty taking deep inspirations. + nausea.

## 2019-08-29 NOTE — PROGRESS NOTES
Dr Adelina Camarena notified patient due to her symptoms of chest pain and tightness w/ nausea, difficulty taking a deep breath and left arm pain she will need to go to ER. Patient and  Tavo Robertson refused to be transported to ER by Ambulance.     Dr. Lion Kennedy contacted

## 2019-08-29 NOTE — ED PROVIDER NOTES
Patient Seen in: [facility] AND [facility] Emergency Department    History   Patient presents with:  Chest Pain Angina (cardiovascular)    Stated Complaint: chest pain    HPI    58-year-old female with past medical history significant for GERD, high blood pressu Systems    Positive for stated complaint: chest pain  Other systems are as noted in HPI. Constitutional and vital signs reviewed. All other systems reviewed and negative except as noted above.     Physical Exam     ED Triage Vitals [08/29/19 1525]   B CBC WITH DIFFERENTIAL WITH PLATELET.   Procedure                               Abnormality         Status                     ---------                               -----------         ------                     CBC W/ DIFFERENTIAL[[id number]]          Abno

## 2019-09-12 ENCOUNTER — HOSPITAL ENCOUNTER (OUTPATIENT)
Dept: CV DIAGNOSTICS | Facility: HOSPITAL | Age: 79
Discharge: HOME | End: 2019-09-12
Attending: INTERNAL MEDICINE
Payer: MEDICARE

## 2019-09-12 DIAGNOSIS — R07.9 CHEST PAIN: ICD-10-CM

## 2019-09-12 DIAGNOSIS — I35.0 AORTIC STENOSIS: ICD-10-CM

## 2019-09-12 PROCEDURE — 93306 TTE W/DOPPLER COMPLETE: CPT | Performed by: INTERNAL MEDICINE

## 2019-10-02 PROBLEM — K59.00 CONSTIPATION, UNSPECIFIED CONSTIPATION TYPE: Status: ACTIVE | Noted: 2019-10-02

## 2019-10-03 ENCOUNTER — HOSPITAL ENCOUNTER (OUTPATIENT)
Dept: MAMMOGRAPHY | Facility: HOSPITAL | Age: 79
Discharge: HOME | End: 2019-10-03
Attending: INTERNAL MEDICINE
Payer: MEDICARE

## 2019-10-03 DIAGNOSIS — Z12.31 ENCOUNTER FOR SCREENING MAMMOGRAM FOR MALIGNANT NEOPLASM OF BREAST: ICD-10-CM

## 2019-10-03 PROCEDURE — 77063 BREAST TOMOSYNTHESIS BI: CPT | Performed by: INTERNAL MEDICINE

## 2019-10-03 PROCEDURE — 77067 SCR MAMMO BI INCL CAD: CPT | Performed by: INTERNAL MEDICINE

## 2019-12-13 ENCOUNTER — OFFICE VISIT (OUTPATIENT)
Dept: OTOLARYNGOLOGY | Facility: CLINIC | Age: 79
End: 2019-12-13
Payer: COMMERCIAL

## 2019-12-13 VITALS — BODY MASS INDEX: 35.41 KG/M2 | HEIGHT: 55 IN | WEIGHT: 153 LBS

## 2019-12-13 DIAGNOSIS — J01.00 ACUTE NON-RECURRENT MAXILLARY SINUSITIS: Primary | ICD-10-CM

## 2019-12-13 PROCEDURE — 99213 OFFICE O/P EST LOW 20 MIN: CPT | Performed by: OTOLARYNGOLOGY

## 2019-12-13 PROCEDURE — G0463 HOSPITAL OUTPT CLINIC VISIT: HCPCS | Performed by: OTOLARYNGOLOGY

## 2019-12-13 RX ORDER — CLINDAMYCIN HYDROCHLORIDE 300 MG/1
300 CAPSULE ORAL 3 TIMES DAILY
Qty: 30 CAPSULE | Refills: 0 | Status: SHIPPED | OUTPATIENT
Start: 2019-12-13 | End: 2019-12-23

## 2019-12-13 NOTE — PROGRESS NOTES
Allie Meneses is a [de-identified]year old female. Patient presents with:  Ear Problem: pt presents with sinus pain, pressure for 1 wk  Ear Problem: bilateral ear pain       HISTORY OF PRESENT ILLNESS  12/13/2019  Patient prevents for evaluation of sinusitis.  This i 05/06/2014   • Head concussion 2009   • History of hiatal hernia    • Hyperlipidemia    • Neuropathy     PERIPHERAL   • Scoliosis     SEVERE       Past Surgical History:   Procedure Laterality Date   • CHOLECYSTECTOMY  2009   • FOOT FRACTURE SURGERY Right Normal, Left: Normal. Hearing is dot   Skin Normal Inspection - Normal.        Lymph Detail Normal Submental. Submandibular. Anterior cervical. Posterior cervical. Supraclavicular.               Nose/Mouth/Throat abNormal Nares - Right: Normal Left: Maddy Durbin

## 2019-12-17 ENCOUNTER — TELEPHONE (OUTPATIENT)
Dept: OTOLARYNGOLOGY | Facility: CLINIC | Age: 79
End: 2019-12-17

## 2019-12-17 NOTE — TELEPHONE ENCOUNTER
Griselda So, MD  You 1 hour ago (10:16 AM)      Just stop the antibiotics continue saline rinses humidity hydration.  See PCP if heartburn and other symptoms worsen    Routing comment

## 2019-12-17 NOTE — TELEPHONE ENCOUNTER
Pt took 2 doses of antibiotic rx and had burning in throat - pharmacy advised her to stop med due to side effects - asking if there is a different liquid antibiotic she can take - daughter in law Deena Erwin is with pt now - pt is having a hard time talking

## 2019-12-17 NOTE — TELEPHONE ENCOUNTER
Dr Lenka Hernandez patient's daughter in law stated patient GERD  got worse,throat is burning when pt start taking the antibiotic Clindamycin pt only had 2 doses and stopped taking it since Saturday,patient is taking Pantoprazole,still not relieved,denies any short

## 2020-02-12 ENCOUNTER — LAB ENCOUNTER (OUTPATIENT)
Dept: LAB | Facility: HOSPITAL | Age: 80
End: 2020-02-12
Attending: INTERNAL MEDICINE
Payer: MEDICARE

## 2020-02-12 DIAGNOSIS — R73.01 IFG (IMPAIRED FASTING GLUCOSE): ICD-10-CM

## 2020-02-12 DIAGNOSIS — N39.0 URINARY TRACT INFECTION, SITE NOT SPECIFIED: ICD-10-CM

## 2020-02-12 DIAGNOSIS — E78.00 PURE HYPERCHOLESTEROLEMIA: Primary | ICD-10-CM

## 2020-02-12 LAB
ALBUMIN SERPL-MCNC: 3.8 G/DL (ref 3.4–5)
ALBUMIN/GLOB SERPL: 1.2 {RATIO} (ref 1–2)
ALP LIVER SERPL-CCNC: 94 U/L (ref 55–142)
ALT SERPL-CCNC: 20 U/L (ref 13–56)
ANION GAP SERPL CALC-SCNC: 5 MMOL/L (ref 0–18)
AST SERPL-CCNC: 11 U/L (ref 15–37)
BASOPHILS # BLD AUTO: 0.03 X10(3) UL (ref 0–0.2)
BASOPHILS NFR BLD AUTO: 0.4 %
BILIRUB SERPL-MCNC: 1.3 MG/DL (ref 0.1–2)
BILIRUB UR QL: NEGATIVE
BUN BLD-MCNC: 15 MG/DL (ref 7–18)
BUN/CREAT SERPL: 20.8 (ref 10–20)
CALCIUM BLD-MCNC: 9.5 MG/DL (ref 8.5–10.1)
CHLORIDE SERPL-SCNC: 108 MMOL/L (ref 98–112)
CHOLEST SMN-MCNC: 152 MG/DL (ref ?–200)
CLARITY UR: CLEAR
CO2 SERPL-SCNC: 29 MMOL/L (ref 21–32)
COLOR UR: YELLOW
CREAT BLD-MCNC: 0.72 MG/DL (ref 0.55–1.02)
DEPRECATED RDW RBC AUTO: 44.8 FL (ref 35.1–46.3)
EOSINOPHIL # BLD AUTO: 0.12 X10(3) UL (ref 0–0.7)
EOSINOPHIL NFR BLD AUTO: 1.8 %
ERYTHROCYTE [DISTWIDTH] IN BLOOD BY AUTOMATED COUNT: 13.8 % (ref 11–15)
EST. AVERAGE GLUCOSE BLD GHB EST-MCNC: 120 MG/DL (ref 68–126)
GLOBULIN PLAS-MCNC: 3.3 G/DL (ref 2.8–4.4)
GLUCOSE BLD-MCNC: 100 MG/DL (ref 70–99)
GLUCOSE UR-MCNC: NEGATIVE MG/DL
HBA1C MFR BLD HPLC: 5.8 % (ref ?–5.7)
HCT VFR BLD AUTO: 45.1 % (ref 35–48)
HDLC SERPL-MCNC: 76 MG/DL (ref 40–59)
HGB BLD-MCNC: 14.6 G/DL (ref 12–16)
HGB UR QL STRIP.AUTO: NEGATIVE
IMM GRANULOCYTES # BLD AUTO: 0.02 X10(3) UL (ref 0–1)
IMM GRANULOCYTES NFR BLD: 0.3 %
KETONES UR-MCNC: NEGATIVE MG/DL
LDLC SERPL CALC-MCNC: 63 MG/DL (ref ?–100)
LYMPHOCYTES # BLD AUTO: 2.47 X10(3) UL (ref 1–4)
LYMPHOCYTES NFR BLD AUTO: 36.2 %
M PROTEIN MFR SERPL ELPH: 7.1 G/DL (ref 6.4–8.2)
MCH RBC QN AUTO: 28.9 PG (ref 26–34)
MCHC RBC AUTO-ENTMCNC: 32.4 G/DL (ref 31–37)
MCV RBC AUTO: 89.1 FL (ref 80–100)
MONOCYTES # BLD AUTO: 0.42 X10(3) UL (ref 0.1–1)
MONOCYTES NFR BLD AUTO: 6.2 %
NEUTROPHILS # BLD AUTO: 3.76 X10 (3) UL (ref 1.5–7.7)
NEUTROPHILS # BLD AUTO: 3.76 X10(3) UL (ref 1.5–7.7)
NEUTROPHILS NFR BLD AUTO: 55.1 %
NITRITE UR QL STRIP.AUTO: NEGATIVE
NONHDLC SERPL-MCNC: 76 MG/DL (ref ?–130)
OSMOLALITY SERPL CALC.SUM OF ELEC: 295 MOSM/KG (ref 275–295)
PATIENT FASTING Y/N/NP: YES
PATIENT FASTING Y/N/NP: YES
PH UR: 6 [PH] (ref 5–8)
PLATELET # BLD AUTO: 342 10(3)UL (ref 150–450)
POTASSIUM SERPL-SCNC: 4.1 MMOL/L (ref 3.5–5.1)
PROT UR-MCNC: NEGATIVE MG/DL
RBC # BLD AUTO: 5.06 X10(6)UL (ref 3.8–5.3)
RBC #/AREA URNS AUTO: 1 /HPF
SODIUM SERPL-SCNC: 142 MMOL/L (ref 136–145)
SP GR UR STRIP: 1.01 (ref 1–1.03)
TRIGL SERPL-MCNC: 65 MG/DL (ref 30–149)
TSI SER-ACNC: 1.81 MIU/ML (ref 0.36–3.74)
UROBILINOGEN UR STRIP-ACNC: <2
VLDLC SERPL CALC-MCNC: 13 MG/DL (ref 0–30)
WBC # BLD AUTO: 6.8 X10(3) UL (ref 4–11)
WBC #/AREA URNS AUTO: 2 /HPF

## 2020-02-12 PROCEDURE — 80053 COMPREHEN METABOLIC PANEL: CPT

## 2020-02-12 PROCEDURE — 80061 LIPID PANEL: CPT

## 2020-02-12 PROCEDURE — 81001 URINALYSIS AUTO W/SCOPE: CPT

## 2020-02-12 PROCEDURE — 83036 HEMOGLOBIN GLYCOSYLATED A1C: CPT

## 2020-02-12 PROCEDURE — 36415 COLL VENOUS BLD VENIPUNCTURE: CPT

## 2020-02-12 PROCEDURE — 85025 COMPLETE CBC W/AUTO DIFF WBC: CPT

## 2020-02-12 PROCEDURE — 84443 ASSAY THYROID STIM HORMONE: CPT

## 2020-08-05 ENCOUNTER — LAB ENCOUNTER (OUTPATIENT)
Dept: LAB | Facility: HOSPITAL | Age: 80
End: 2020-08-05
Attending: INTERNAL MEDICINE
Payer: MEDICARE

## 2020-08-05 DIAGNOSIS — R73.01 IMPAIRED FASTING GLUCOSE: ICD-10-CM

## 2020-08-05 DIAGNOSIS — R35.89 FREQUENCY OF URINATION AND POLYURIA: Primary | ICD-10-CM

## 2020-08-05 DIAGNOSIS — E78.00 PURE HYPERCHOLESTEROLEMIA: Primary | ICD-10-CM

## 2020-08-05 DIAGNOSIS — R35.0 FREQUENCY OF URINATION AND POLYURIA: Primary | ICD-10-CM

## 2020-08-05 DIAGNOSIS — R35.89 FREQUENCY OF URINATION AND POLYURIA: ICD-10-CM

## 2020-08-05 DIAGNOSIS — E07.89 OTHER SPECIFIED DISORDERS OF THYROID: ICD-10-CM

## 2020-08-05 DIAGNOSIS — R35.0 FREQUENCY OF URINATION AND POLYURIA: ICD-10-CM

## 2020-08-05 LAB
BILIRUB UR QL: NEGATIVE
CLARITY UR: CLEAR
COLOR UR: YELLOW
GLUCOSE UR-MCNC: NEGATIVE MG/DL
HGB UR QL STRIP.AUTO: NEGATIVE
KETONES UR-MCNC: 20 MG/DL
NITRITE UR QL STRIP.AUTO: NEGATIVE
PH UR: 6 [PH] (ref 5–8)
PROT UR-MCNC: NEGATIVE MG/DL
RBC #/AREA URNS AUTO: 1 /HPF
SP GR UR STRIP: 1.01 (ref 1–1.03)
UROBILINOGEN UR STRIP-ACNC: <2
WBC #/AREA URNS AUTO: 7 /HPF

## 2020-08-05 PROCEDURE — 81001 URINALYSIS AUTO W/SCOPE: CPT

## 2020-08-05 PROCEDURE — 87086 URINE CULTURE/COLONY COUNT: CPT

## 2020-11-30 ENCOUNTER — LAB ENCOUNTER (OUTPATIENT)
Dept: LAB | Facility: HOSPITAL | Age: 80
End: 2020-11-30
Attending: INTERNAL MEDICINE
Payer: MEDICARE

## 2020-11-30 DIAGNOSIS — N30.00 ACUTE CYSTITIS WITHOUT HEMATURIA: Primary | ICD-10-CM

## 2020-11-30 DIAGNOSIS — N30.00 ACUTE CYSTITIS WITHOUT HEMATURIA: ICD-10-CM

## 2020-11-30 PROCEDURE — 81001 URINALYSIS AUTO W/SCOPE: CPT

## 2020-12-03 ENCOUNTER — PATIENT MESSAGE (OUTPATIENT)
Dept: NEUROLOGY | Facility: CLINIC | Age: 80
End: 2020-12-03

## 2020-12-03 NOTE — TELEPHONE ENCOUNTER
From: Lucrecia Speaks  To: Archana Thompson MD  Sent: 12/3/2020 11:33 AM CST  Subject: Prescription Question    I am trying to contact Dr. Crow Reynolds. I cannot seem to reach him. I am having significant lower back pain.  I have a small amount of liquid 10mg hyrdoc

## 2020-12-03 NOTE — TELEPHONE ENCOUNTER
Called patient-     Patient states she is having back pain and abdominal pain. She thought she is having a UTI but was checked and was negative. Pain is in low left side of back \"kidney area\" constant pain.     Verbally discussed with [facility] who s

## 2020-12-04 ENCOUNTER — TELEPHONE (OUTPATIENT)
Dept: NEUROLOGY | Facility: CLINIC | Age: 80
End: 2020-12-04

## 2020-12-04 NOTE — TELEPHONE ENCOUNTER
I received an after hours phone call from the patient's  and grandson regarding a conversation had between Dr. Samir Amador staff and Mrs. Dariusz Devi. She has not been seen by Dr. Viann Meckel in the office since August 2019. I was unable to speak to Mrs. Dariusz Devi be

## 2021-04-09 ENCOUNTER — LAB ENCOUNTER (OUTPATIENT)
Dept: LAB | Facility: HOSPITAL | Age: 81
End: 2021-04-09
Attending: INTERNAL MEDICINE
Payer: MEDICARE

## 2021-04-09 DIAGNOSIS — R07.9 CHEST PAIN: ICD-10-CM

## 2021-04-09 DIAGNOSIS — R73.01 IFG (IMPAIRED FASTING GLUCOSE): ICD-10-CM

## 2021-04-09 DIAGNOSIS — Z20.822 EXPOSURE TO COVID-19 VIRUS: Primary | ICD-10-CM

## 2021-04-09 DIAGNOSIS — R53.83 FATIGUE: ICD-10-CM

## 2021-04-09 PROCEDURE — 93010 ELECTROCARDIOGRAM REPORT: CPT | Performed by: INTERNAL MEDICINE

## 2021-04-09 PROCEDURE — 80053 COMPREHEN METABOLIC PANEL: CPT

## 2021-04-09 PROCEDURE — 84443 ASSAY THYROID STIM HORMONE: CPT

## 2021-04-09 PROCEDURE — 86769 SARS-COV-2 COVID-19 ANTIBODY: CPT

## 2021-04-09 PROCEDURE — 93005 ELECTROCARDIOGRAM TRACING: CPT

## 2021-04-09 PROCEDURE — 36415 COLL VENOUS BLD VENIPUNCTURE: CPT

## 2021-04-09 PROCEDURE — 85025 COMPLETE CBC W/AUTO DIFF WBC: CPT

## 2021-04-09 PROCEDURE — 83036 HEMOGLOBIN GLYCOSYLATED A1C: CPT

## 2021-04-20 ENCOUNTER — LAB REQUISITION (OUTPATIENT)
Dept: LAB | Facility: HOSPITAL | Age: 81
End: 2021-04-20
Payer: MEDICARE

## 2021-04-20 DIAGNOSIS — Z01.818 ENCOUNTER FOR OTHER PREPROCEDURAL EXAMINATION: ICD-10-CM

## 2021-06-23 PROBLEM — K44.9 DIAPHRAGMATIC HERNIA: Status: ACTIVE | Noted: 2021-06-23

## 2021-06-24 ENCOUNTER — LAB ENCOUNTER (OUTPATIENT)
Dept: LAB | Facility: HOSPITAL | Age: 81
End: 2021-06-24
Attending: INTERNAL MEDICINE
Payer: MEDICARE

## 2021-06-24 DIAGNOSIS — K62.89 RECTAL PAIN: ICD-10-CM

## 2021-06-24 DIAGNOSIS — R11.0 NAUSEA: ICD-10-CM

## 2021-06-24 DIAGNOSIS — R63.4 WEIGHT LOSS: ICD-10-CM

## 2021-06-24 DIAGNOSIS — R10.13 EPIGASTRIC PAIN: ICD-10-CM

## 2021-06-24 PROCEDURE — 83690 ASSAY OF LIPASE: CPT

## 2021-06-24 PROCEDURE — 80053 COMPREHEN METABOLIC PANEL: CPT

## 2021-06-24 PROCEDURE — 85025 COMPLETE CBC W/AUTO DIFF WBC: CPT

## 2021-06-24 PROCEDURE — 36415 COLL VENOUS BLD VENIPUNCTURE: CPT

## 2021-07-01 ENCOUNTER — HOSPITAL ENCOUNTER (OUTPATIENT)
Dept: CT IMAGING | Facility: HOSPITAL | Age: 81
Discharge: HOME | End: 2021-07-01
Attending: INTERNAL MEDICINE
Payer: MEDICARE

## 2021-07-01 DIAGNOSIS — R11.0 NAUSEA: ICD-10-CM

## 2021-07-01 DIAGNOSIS — R63.4 WEIGHT LOSS: ICD-10-CM

## 2021-07-01 DIAGNOSIS — R10.13 EPIGASTRIC PAIN: ICD-10-CM

## 2021-07-01 DIAGNOSIS — K62.89 RECTAL PAIN: ICD-10-CM

## 2021-07-01 PROCEDURE — 74177 CT ABD & PELVIS W/CONTRAST: CPT | Performed by: INTERNAL MEDICINE

## 2021-07-07 NOTE — PROGRESS NOTES
I reviewed the results with the patient and her . The CT shows no acute pathology. There is laxity of the pelvic floor. A large amount of stool is seen throughout the colon. She now reports that she has not been taking Benefiber and Miralax.  She

## 2021-10-27 ENCOUNTER — LAB ENCOUNTER (OUTPATIENT)
Dept: LAB | Facility: HOSPITAL | Age: 81
End: 2021-10-27
Attending: INTERNAL MEDICINE
Payer: MEDICARE

## 2021-10-27 DIAGNOSIS — E78.00 PURE HYPERCHOLESTEROLEMIA: Primary | ICD-10-CM

## 2021-10-27 DIAGNOSIS — R73.01 IMPAIRED FASTING GLUCOSE: ICD-10-CM

## 2021-10-27 DIAGNOSIS — N39.0 URINARY TRACT INFECTION, SITE NOT SPECIFIED: ICD-10-CM

## 2021-10-27 PROCEDURE — 36415 COLL VENOUS BLD VENIPUNCTURE: CPT

## 2021-10-27 PROCEDURE — 85025 COMPLETE CBC W/AUTO DIFF WBC: CPT

## 2021-10-27 PROCEDURE — 84443 ASSAY THYROID STIM HORMONE: CPT

## 2021-10-27 PROCEDURE — 80053 COMPREHEN METABOLIC PANEL: CPT

## 2021-10-27 PROCEDURE — 80061 LIPID PANEL: CPT

## 2021-10-27 PROCEDURE — 87086 URINE CULTURE/COLONY COUNT: CPT

## 2021-10-27 PROCEDURE — 83036 HEMOGLOBIN GLYCOSYLATED A1C: CPT

## 2021-10-27 PROCEDURE — 81001 URINALYSIS AUTO W/SCOPE: CPT

## 2021-11-09 ENCOUNTER — HOSPITAL ENCOUNTER (OUTPATIENT)
Dept: ULTRASOUND IMAGING | Facility: HOSPITAL | Age: 81
Discharge: HOME | End: 2021-11-09
Attending: INTERNAL MEDICINE
Payer: MEDICARE

## 2021-11-09 DIAGNOSIS — I73.9 CLAUDICATION (HCC): ICD-10-CM

## 2021-11-09 PROCEDURE — 93923 UPR/LXTR ART STDY 3+ LVLS: CPT | Performed by: INTERNAL MEDICINE

## 2022-01-03 ENCOUNTER — TELEPHONE (OUTPATIENT)
Dept: PHYSICAL MEDICINE AND REHAB | Facility: CLINIC | Age: 82
End: 2022-01-03

## 2022-01-03 ENCOUNTER — TELEPHONE (OUTPATIENT)
Dept: NEUROLOGY | Facility: CLINIC | Age: 82
End: 2022-01-03

## 2022-01-03 NOTE — TELEPHONE ENCOUNTER
This [employer] members plan does not currently require a prior authorization for these services  MRI BRAIN (CPT=70551)          Decision ID #:[id number]. Angeline garland.

## 2022-01-03 NOTE — PROGRESS NOTES
Prior to Ms. [address] [facility] office visit I had reviewed epic records, previous neurology records, MRI of the brain, acetylcholine receptor antibodies, Lyme's serology, B12, treponemal antibody. Also reviewed extensive speech therapy notes.   She was in the offic

## 2022-01-03 NOTE — TELEPHONE ENCOUNTER
Spoke to patient's daughter-in-law who states that patient is very claustrophobic and will need an open MRI. She spoke with scheduling and was informed that it can be done at a few locations, [place] being of them.   New order placed indicating open M

## 2022-01-03 NOTE — TELEPHONE ENCOUNTER
Patient's daughter in law called and is asking for the MRI Order to be for an \"Open\" MRI. Patient is claustrophobic.

## 2022-01-03 NOTE — TELEPHONE ENCOUNTER
Initiated authorization for Brain MRI CPT 02896 with [facility] online  Case #[id number]  Status: Approved-  This member's benefit plan did not require a prior authorization for this request.     Please print and retain a copy of this confirmation in the patient'

## 2022-01-04 DIAGNOSIS — F02.80 ALZHEIMER DISEASE (HCC): ICD-10-CM

## 2022-01-04 DIAGNOSIS — G30.9 ALZHEIMER DISEASE (HCC): ICD-10-CM

## 2022-01-04 RX ORDER — DONEPEZIL HYDROCHLORIDE 5 MG/1
TABLET, FILM COATED ORAL
Qty: 30 TABLET | Refills: 0 | Status: SHIPPED | OUTPATIENT
Start: 2022-01-04

## 2022-01-04 RX ORDER — DONEPEZIL HYDROCHLORIDE 10 MG/1
TABLET, FILM COATED ORAL
Qty: 90 TABLET | Refills: 3 | Status: SHIPPED | OUTPATIENT
Start: 2022-01-04

## 2022-01-04 NOTE — TELEPHONE ENCOUNTER
Received fax from pharmacy stating insurance will only cover 1 tablet daily and if we could send in new rx for 10mg tablets.      Donepezil 5mg, take 1 tab every morning for 30 days, #30, no refills    Donepezil 10mg, take 1 tab every morning, #90, 3 refill

## 2022-01-13 ENCOUNTER — HOSPITAL ENCOUNTER (OUTPATIENT)
Dept: MRI IMAGING | Age: 82
Discharge: HOME | End: 2022-01-13
Attending: Other
Payer: MEDICARE

## 2022-01-13 DIAGNOSIS — G30.9 ALZHEIMER'S DISEASE (HCC): ICD-10-CM

## 2022-01-13 DIAGNOSIS — F02.80 ALZHEIMER'S DISEASE (HCC): ICD-10-CM

## 2022-01-13 PROCEDURE — 70551 MRI BRAIN STEM W/O DYE: CPT | Performed by: OTHER

## 2022-02-09 ENCOUNTER — APPOINTMENT (OUTPATIENT)
Dept: PHYSICAL THERAPY | Facility: HOSPITAL | Age: 82
End: 2022-02-09
Attending: INTERNAL MEDICINE
Payer: MEDICARE

## 2022-02-11 ENCOUNTER — TELEPHONE (OUTPATIENT)
Dept: PHYSICAL THERAPY | Facility: HOSPITAL | Age: 82
End: 2022-02-11

## 2022-02-14 ENCOUNTER — OFFICE VISIT (OUTPATIENT)
Dept: PHYSICAL THERAPY | Facility: HOSPITAL | Age: 82
End: 2022-02-14
Attending: INTERNAL MEDICINE
Payer: MEDICARE

## 2022-02-14 DIAGNOSIS — Z74.09 IMPAIRED FUNCTIONAL MOBILITY, BALANCE, GAIT, AND ENDURANCE: ICD-10-CM

## 2022-02-14 PROCEDURE — 97162 PT EVAL MOD COMPLEX 30 MIN: CPT

## 2022-02-15 ENCOUNTER — APPOINTMENT (OUTPATIENT)
Dept: SPEECH THERAPY | Facility: HOSPITAL | Age: 82
End: 2022-02-15
Attending: INTERNAL MEDICINE
Payer: MEDICARE

## 2022-02-16 ENCOUNTER — TELEPHONE (OUTPATIENT)
Dept: PHYSICAL THERAPY | Facility: HOSPITAL | Age: 82
End: 2022-02-16

## 2022-02-16 ENCOUNTER — APPOINTMENT (OUTPATIENT)
Dept: PHYSICAL THERAPY | Facility: HOSPITAL | Age: 82
End: 2022-02-16
Attending: INTERNAL MEDICINE
Payer: MEDICARE

## 2022-02-16 ENCOUNTER — ORDER TRANSCRIPTION (OUTPATIENT)
Dept: OCCUPATIONAL MEDICINE | Facility: HOSPITAL | Age: 82
End: 2022-02-16

## 2022-02-17 ENCOUNTER — OFFICE VISIT (OUTPATIENT)
Dept: OCCUPATIONAL MEDICINE | Facility: HOSPITAL | Age: 82
End: 2022-02-17
Attending: INTERNAL MEDICINE
Payer: MEDICARE

## 2022-02-17 DIAGNOSIS — R25.1 TREMORS OF NERVOUS SYSTEM: ICD-10-CM

## 2022-02-17 DIAGNOSIS — R27.8 WORSENED HANDWRITING: ICD-10-CM

## 2022-02-17 DIAGNOSIS — R29.898 HAND WEAKNESS: ICD-10-CM

## 2022-02-17 PROCEDURE — 97166 OT EVAL MOD COMPLEX 45 MIN: CPT | Performed by: OCCUPATIONAL THERAPIST

## 2022-02-18 ENCOUNTER — OFFICE VISIT (OUTPATIENT)
Dept: SPEECH THERAPY | Facility: HOSPITAL | Age: 82
End: 2022-02-18
Attending: INTERNAL MEDICINE
Payer: MEDICARE

## 2022-02-18 DIAGNOSIS — R47.89 OTHER SPEECH DISTURBANCE: ICD-10-CM

## 2022-02-18 PROCEDURE — 92523 SPEECH SOUND LANG COMPREHEN: CPT

## 2022-02-22 ENCOUNTER — OFFICE VISIT (OUTPATIENT)
Dept: SPEECH THERAPY | Facility: HOSPITAL | Age: 82
End: 2022-02-22
Attending: INTERNAL MEDICINE
Payer: MEDICARE

## 2022-02-22 DIAGNOSIS — R47.89 OTHER SPEECH DISTURBANCE: ICD-10-CM

## 2022-02-22 PROCEDURE — 92507 TX SP LANG VOICE COMM INDIV: CPT

## 2022-02-22 NOTE — PROGRESS NOTES
THERAPY SESSION NOTE     Diagnosis: Other speech disturbance (R47.89) Dysarthria  Authorized # of Visits: 8   # Visits Remainin  Certification Ends:            Fall Risk: standard        Precautions: Covid PPE          Pain Rating: pt reports no pain 0/10  Subjective: Pt arrived on time to session. Friendly and cooperative demeanor. Objective:   Speech and Voice Therapy    Date: 22  Tx#: 2 Date: Tx#:  Date: Tx#: Date: Tx#:  Date: Tx#:    MPT MPT 15 sec with range of 13-15, when cued for diaphragmatic breath and increased intensity, MPT decreased to 13 sec with range of 10-13  NL for adult females is 15-25 seconds          Intensity Average intensity is 70 dB SPL   Pt given breaks when reporting SOB, SPO2 was measured and was observed >95% t/o session  Stating name in conversation - 61   Sustaining /a/ - avg 65 dB  CV, cues t/o for diaphragmatic breathing and voice projection/visualization  Pt reported it felt like a lot of effort   CV/M - avg 71.6 dB range 68-80 (2 trials of 5 vowels)  CV/B - avg 68 dB range 68-75         Intensity, Articulation while Reading           WE Tasks         WF Tasks          WF Strategies              Assessment: Focus on intensity in structured tasks this session (CV syllables). Benefit from cues for diaphragmatic breathing and cues for visualization. Stimulable up to average intensity. Decreased MPT at increased intensity. Assigned CV syllables for HEP. Goals: (to be met in 12 visits)    Pt will approximate max phonation time to functional limits. Pt will maintain adequate intensity t/o structured speech tasks with minimal cues. Pt will maintain adequate intensity and articulatory accuracy t/o structured reading tasks at the word and phrase level with minimal assistance. Pt will complete sentence WE tasks with 80% accuracy independently. Pt will complete basic-mod word finding tasks with minimal assisstance.    Pt will utilize word finding strategies t/o conversation in 80% of opportunities with minimal assistance. Plan: Continue therapy per goals above. Continue HEP tasks daily.     Skilled Services: Speech Therapy    Charges: [id number]         Total Treatment Time: 45 min    Jamey Sharma MS -SLP   Speech Language Pathologist   [facility]   [phone]

## 2022-03-01 ENCOUNTER — OFFICE VISIT (OUTPATIENT)
Dept: PHYSICAL THERAPY | Facility: HOSPITAL | Age: 82
End: 2022-03-01
Attending: INTERNAL MEDICINE
Payer: MEDICARE

## 2022-03-01 PROCEDURE — 97110 THERAPEUTIC EXERCISES: CPT

## 2022-03-01 NOTE — PATIENT INSTRUCTIONS
Do these exercises daily. 1. Sit up tall in chair. Lift leg up and across as if you are going to tie your shoe. Lift ad high as you are able then return foot to floor. Do this 10 times with each leg. 2. Sit up tall in chair. Tie red band around thighs, above knee. Separate knees then bring them back together. Do this 15 times. 3. Stand up tall at countertop top and hold on with both hands. Toes pointed forward. Lift heels up then lower them back to the ground. Do this 15 times. 4. Stand up tall at countertop and hold on with one hand. With the leg closer to the countertop, march your knee up then lower foot back to ground. Do this 10 times. Then turn around and hold on with the other hand, marching with the other leg 10 times. 5. Stand up tall at countertop top and hold on with both hands. Hips and toes pointed forward. Side step along countertop in both directions until you have done 10-15 each way. Make sure to try and take wide steps out to the side.

## 2022-03-01 NOTE — PROGRESS NOTES
Diagnosis: difficulty walking, decreased tolerance to activity       Visit (# authorized):  8 per [facility])                        Referring Physician: Susy Lenz    Precautions: at risk of falls     Medication changes since last visit?:  No    Subjective: Has been experiencing more intense back pain for past few days - taking Tylenol for pain relief. Pain currently 5/10. Using TENS unit as well. Has been trying to walk more at home as instructed. Presents with RW today. Denies falls since she was last seen. Objective:    Date  3/1          Visit Number  2          NMR           Ther EX x          Ther Act           Gait Training           CRM            Manual             Additional Treatment Information:    Therapeutic Exercise (38 mins):   Nu-step BLE only x8 mins level 3  Seated: LE diagonals AG x10 B VC's for max effort   Seated: hip ABD red band x15 VC's for max effort   Standing BUE support B heel raise x15   Standing: BUE support: side stepping length of counter x2 B- VC's for increased step length   Standin UE support:  B     Attempted to resize walker for height however walker too high even in shortest setting - advised family that pediatric sized walker would be more appropriate for pt should she plan to use more regularly. Advised continued walking for exercise- can use LE cycle in home as well but should concentrate on walking. Set up for sit<>stand explained - attempted without UE support but pt unable     Patient Education:  As above. HEP initiated and issued- see pt instructions     Assessment:   Pt requiring increased time for processing and motor planning. Very slow movement patterns resulting in increased time required to complete tasks. Session tolerated well overall.      Plan: in future sessions: shuttle, upper trunk rotations, standing tasks unsupported    Charges: 3 TE       Total Timed Treatment: 38 min  Total Treatment Time: 38 min

## 2022-03-03 ENCOUNTER — OFFICE VISIT (OUTPATIENT)
Dept: OCCUPATIONAL MEDICINE | Facility: HOSPITAL | Age: 82
End: 2022-03-03
Attending: INTERNAL MEDICINE
Payer: MEDICARE

## 2022-03-03 PROCEDURE — 97110 THERAPEUTIC EXERCISES: CPT | Performed by: OCCUPATIONAL THERAPIST

## 2022-03-03 PROCEDURE — 97140 MANUAL THERAPY 1/> REGIONS: CPT | Performed by: OCCUPATIONAL THERAPIST

## 2022-03-03 NOTE — PROGRESS NOTES
Dx:    Tremors of nervous system (R25.1)  Worsened handwriting (R27.8)  Hand weakness (R29.89     Authorized # of Visits: 6        Next MD visit: none scheduled  Fall Risk: standard         Precautions: fall risk       Medication Changes since last visit?: No  Subjective: Patient reports having difficulty with playing piano due to weakness in hands. Objective: Treatment began with shoulder PROM follow by cane exercises, two point pinch, light intrinsic hand strengthening. See flow sheet for details    Date 02/17/22 03/03/22               Visit # 1  2                                  Evaluation x                Manual                   PROM shoulder   bilateral shoulder flexion/ext, abduciton5 min               Scapula mobilization right    3 min                                   Ther ex                   Cane shoulder, flex/ext, elbow flex/ext, abduction, circles   x10 each               Two point pinch velcro checkers   bilateral 15 checkers each                reaching across midline to place checkers on tree    x10 per side                yellow putty,  and two point pinch    bilateral hand 5 min                blue sponge,  and rotate    x5 bilateral                                                       HEP instruction                       hand dexterity exercises  cane exercises x 10, twice/day               Therapeutic Activity                                       Neuromuscular Re-education                                                             Modalities                                                               Assessment: Observed right shoulder stiffness, began session with scapula mobilization and shoulder PROM/AROM exercises to promote functional reaching. Patient taking prolonged period of time to complete tasks and cues for sequence of activities. Patient reporting some fatigue but able to complete all.       Goals:   Pt will be at supervision level and compliant with comprehensive HEP to maintain progress achieved in OT. Patient sensation in right hand to be assessed with Faisal Bonier monofilaments. Patient will demonstrate increase in right  strength to at least 20 lbs for ease in opening containers. Patient will demonstrate improved hand dexterity with decrease in 9 hole peg test time by 10 secs for ease in writing. Patient will demonstrate increase in right shoulder flexion to at least 90 degrees, abduction to 70 degrees for ease in feeding self      Plan: Continue to work on improving AROM and strength for ease in functional reaching.     Charges: MT, TE2   Total Timed Treatment: 45 min  Total Treatment Time: 45 min

## 2022-03-04 ENCOUNTER — OFFICE VISIT (OUTPATIENT)
Dept: SPEECH THERAPY | Facility: HOSPITAL | Age: 82
End: 2022-03-04
Attending: INTERNAL MEDICINE
Payer: MEDICARE

## 2022-03-04 DIAGNOSIS — R47.89 OTHER SPEECH DISTURBANCE: ICD-10-CM

## 2022-03-04 PROCEDURE — 92507 TX SP LANG VOICE COMM INDIV: CPT

## 2022-03-04 NOTE — PROGRESS NOTES
THERAPY SESSION NOTE     Diagnosis: Other speech disturbance (R47.89) Dysarthria  Authorized # of Visits: 8   # Visits Remaining: 3  Certification Ends: 54/55/0596           Fall Risk: standard        Precautions: Covid PPE          Pain Rating: pt reports no pain 0/10  Subjective: Pt arrived on time to session. Friendly and cooperative demeanor. Objective:   Speech and Voice Therapy    Date: 02/22/22  Tx#: 2 Date: 03/04/22  Tx#: 3 Date: Tx#: Date: Tx#:  Date: Tx#:    MPT MPT 15 sec with range of 13-15, when cued for diaphragmatic breath and increased intensity, MPT decreased to 13 sec with range of 10-13  NL for adult females is 15-25 seconds  MPT - 12 seconds        Intensity Average intensity is 70 dB SPL   Pt given breaks when reporting SOB, SPO2 was measured and was observed >95% t/o session  Stating name in conversation - 61   Sustaining /a/ - avg 65 dB  CV, cues t/o for diaphragmatic breathing and voice projection/visualization  Pt reported it felt like a lot of effort   CV/M - avg 71.6 dB range 68-80 (2 trials of 5 vowels)  CV/B - avg 68 dB range 68-75 Intensity   Sustaining - 73 dB (avg)  CV/M - 72 dB  CV/B - 77 dB   Days of Week - 67dB   Name - 64dB    Cues for breathing t/o. Extremely decreased initiation of response when cued. For this reason, other goals were not able to be targeted. Pt reports difficulty deep breathing d/t scoliosis        Intensity, Articulation while Reading           WE Tasks         WF Tasks          WF Strategies              Assessment: Focus on intensity in structured tasks this session (CV syllables) as well as functional tasks. Decreased initiation/delayed response limiting goal tasks. Assigned CV and days of week syllables for HEP. Goals: (to be met in 12 visits)    Pt will approximate max phonation time to functional limits. Pt will maintain adequate intensity t/o structured speech tasks with minimal cues.    Pt will maintain adequate intensity and articulatory accuracy t/o structured reading tasks at the word and phrase level with minimal assistance. Pt will complete sentence WE tasks with 80% accuracy independently. Pt will complete basic-mod word finding tasks with minimal assisstance. Pt will utilize word finding strategies t/o conversation in 80% of opportunities with minimal assistance. Plan: Continue therapy per goals above. Continue HEP tasks daily.     Skilled Services: Speech Therapy    Charges: [id number]         Total Treatment Time: 45 min    Fam Reich MS CF-SLP   Speech Language Pathologist   [facility]   [phone]

## 2022-03-08 ENCOUNTER — OFFICE VISIT (OUTPATIENT)
Dept: PHYSICAL THERAPY | Facility: HOSPITAL | Age: 82
End: 2022-03-08
Attending: INTERNAL MEDICINE
Payer: MEDICARE

## 2022-03-08 PROCEDURE — 97112 NEUROMUSCULAR REEDUCATION: CPT

## 2022-03-08 PROCEDURE — 97110 THERAPEUTIC EXERCISES: CPT

## 2022-03-08 NOTE — PROGRESS NOTES
Diagnosis: difficulty walking, decreased tolerance to activity       Visit (# authorized):  8 per [facility])                        Referring Physician: Juarez Butcher    Precautions: at risk of falls     Medication changes since last visit?:  No    Subjective: Has been experiencing more intense back pain for past few days - taking Tylenol for pain relief. Pain currently 5/10. Using TENS unit as well. Has been trying to walk more at home as instructed. Presents with RW today. Denies falls since she was last seen. Objective:    Date  3/1 3/8         Visit Number  2 3         NMR  x         Ther EX x x         Ther Act           Gait Training           CRM            Manual             Additional Treatment Information:    Therapeutic Exercise (30 mins):   Nu-step BLE only x10 mins level 3- goal direction based on SPM  Shuttle    B press (blue, silver, yellow) 3x10    B heel raise (yellow, silver)x10   SL press (yellow, silver) x10 B     NMR (12 mins):   Standing unsupported: SBA, CGA for balance checks     B shoulder flex 2# ball eyes and head follow x8 -VC;s for sequence    Upper trunk rotation x5 B VC's for sequence and max effort    Playing catch bouncing back to pt's son -x5     Patient Education:  Set -up/\"scooting forward\" prior to standing discussed with pt and family. Assessment:   Pt requiring increased time for processing and motor planning. Very slow movement patterns resulting in increased time required to complete tasks. Increased postural instability with increased fatigue/time in standing.      Plan: in future sessions:continue shuttle,continue upper trunk rotations, standing tasks unsupported    Charges: 2 TE, 1 NMR       Total Timed Treatment: 42 min  Total Treatment Time: 42 min

## 2022-03-09 ENCOUNTER — TELEPHONE (OUTPATIENT)
Dept: PHYSICAL THERAPY | Facility: HOSPITAL | Age: 82
End: 2022-03-09

## 2022-03-09 ENCOUNTER — APPOINTMENT (OUTPATIENT)
Dept: OCCUPATIONAL MEDICINE | Facility: HOSPITAL | Age: 82
End: 2022-03-09
Attending: INTERNAL MEDICINE
Payer: MEDICARE

## 2022-03-10 ENCOUNTER — OFFICE VISIT (OUTPATIENT)
Dept: PHYSICAL THERAPY | Facility: HOSPITAL | Age: 82
End: 2022-03-10
Attending: INTERNAL MEDICINE
Payer: MEDICARE

## 2022-03-10 PROCEDURE — 97530 THERAPEUTIC ACTIVITIES: CPT

## 2022-03-10 PROCEDURE — 97110 THERAPEUTIC EXERCISES: CPT

## 2022-03-10 NOTE — PATIENT INSTRUCTIONS
For standing up and sitting down. To stand up:     Scoot forward in chair. Arms and hands in front of you not behind. Scoot hips forward until you are more towards the edge of the chair. Lean forward. Your family member can have a hand on your back to make sure you stay froward as you push through your arms and legs to stand up. To sit down. Make sure there is a small distance between the backs of your legs and the chair. Backs of legs should not be touching the chair prior to trying to sit down. Lean forward and stick bottom out, then slowly bend knees to sit.

## 2022-03-11 ENCOUNTER — OFFICE VISIT (OUTPATIENT)
Dept: OCCUPATIONAL MEDICINE | Facility: HOSPITAL | Age: 82
End: 2022-03-11
Attending: INTERNAL MEDICINE
Payer: MEDICARE

## 2022-03-11 PROCEDURE — 97110 THERAPEUTIC EXERCISES: CPT | Performed by: OCCUPATIONAL THERAPIST

## 2022-03-11 PROCEDURE — 97140 MANUAL THERAPY 1/> REGIONS: CPT | Performed by: OCCUPATIONAL THERAPIST

## 2022-03-11 NOTE — PROGRESS NOTES
Dx:    Tremors of nervous system (R25.1)  Worsened handwriting (R27.8)  Hand weakness (R29.89     Authorized # of Visits: 6        Next MD visit: none scheduled  Fall Risk: standard         Precautions: fall risk , difficulty vocalizing     Medication Changes since last visit?: No  Subjective: Family member commented that Cindy Chacon is more active at home lately. Objective: Treatment began with shoulder PROM follow by scapula and shoulder A/PROM, two point pinch, light intrinsic hand strengthening. See flow sheet for details.     Wheeler Josiah Assessment of right  volar fingertips    D1, D2, D4 and D5 2.83 Normal light touch  D3   3.22 Diminished light touch      Date 02/17/22 03/03/22 03/11/22             Visit # 1  2  3                                Evaluation x                Manual                   PROM shoulder   bilateral shoulder flexion/ext, abduciton5 min  right shoulder flexion/ext, abduciton5 min             Scapula mobilization right    3 min  3 min                    semmes Josiah assessment of volar fingertips             Ther ex                   Cane shoulder, flex/ext, elbow flex/ext, abduction, circles   x10 each  scapula AROM- shoulder shrugs, retraction/protraction x 10 each             Two point pinch velcro checkers   bilateral 15 checkers each  right hand 40 checkers              reaching across midline to place checkers on tree    x10 per side  x10 right side              yellow putty,  and two point pinch    bilateral hand 5 min  right shoulder AAROM flexion/extension, abduction/adduction x 10              blue sponge,  and rotate    x5 bilateral                                                      HEP instruction                       hand dexterity exercises  cane exercises x 10, twice/day               Therapeutic Activity                                       Neuromuscular Re-education                                                             Modalities Moist heat      3 min                                     Assessment: Assessed sensation in right hand, Faisal Beard indicated normal sensation although patient reports occasional paresthesia. Patient reporting mild pain in right shoulder during session today but able to complete activities. Provided family member with suggestions on using functional activities at home to improve strength and fine motor skills in right hand- example: washing dishes, folding laundry, playing board games    Goals:   Pt will be at supervision level and compliant with comprehensive HEP to maintain progress achieved in OT. Patient sensation in right hand to be assessed with Faisal Beard monofilaments. ...(achieved)  Patient will demonstrate increase in right  strength to at least 20 lbs for ease in opening containers. Patient will demonstrate improved hand dexterity with decrease in 9 hole peg test time by 10 secs for ease in writing. Patient will demonstrate increase in right shoulder flexion to at least 90 degrees, abduction to 70 degrees for ease in feeding self      Plan: Continue to work on improving AROM and strength for ease in functional reaching.     Charges: MT, TE2   Total Timed Treatment: 45 min  Total Treatment Time: 45 min

## 2022-03-14 ENCOUNTER — OFFICE VISIT (OUTPATIENT)
Dept: OCCUPATIONAL MEDICINE | Facility: HOSPITAL | Age: 82
End: 2022-03-14
Attending: INTERNAL MEDICINE
Payer: MEDICARE

## 2022-03-14 PROCEDURE — 97110 THERAPEUTIC EXERCISES: CPT | Performed by: OCCUPATIONAL THERAPIST

## 2022-03-14 PROCEDURE — 97140 MANUAL THERAPY 1/> REGIONS: CPT | Performed by: OCCUPATIONAL THERAPIST

## 2022-03-14 NOTE — PROGRESS NOTES
Dx:    Tremors of nervous system (R25.1)  Worsened handwriting (R27.8)  Hand weakness (R29.89     Authorized # of Visits: 6        Next MD visit: none scheduled  Fall Risk: standard         Precautions: fall risk , difficulty vocalizing     Medication Changes since last visit?: No  Subjective: \"My right shoulder hurts a little.'    Objective: Treatment began with shoulder PROM follow by scapula and shoulder A/PROM, two point pinch, light intrinsic hand strengthening. See flow sheet for details.       Date 02/17/22 03/03/22 03/11/22 03/14/22           Visit # 1  2  3  4                              Evaluation x                Manual                   PROM shoulder   bilateral shoulder flexion/ext, abduciton5 min  right shoulder flexion/ext, abduciton5 min  STM right bicep 5 min           Scapula mobilization right    3 min  3 min  3 min                  semmes Josiah assessment of volar fingertips             Ther ex                   Cane shoulder, flex/ext, elbow flex/ext, abduction, circles   x10 each  scapula AROM- shoulder shrugs, retraction/protraction x 10 each  scapula AROM- shoulder shrugs, retraction/protraction x 10 each           Two point pinch velcro checkers   bilateral 15 checkers each  right hand 40 checkers  standing to place 15 yellow, red Clothespins            reaching across midline to place checkers on tree    x10 per side  x10 right side  Standing t remove 15, yellow, red Clothespins with left            yellow putty,  and two point pinch    bilateral hand 5 min  right shoulder AAROM flexion/extension, abduction/adduction x 10  yellow t band IR/ER x10  Bilateral            blue sponge,  and rotate    x5 bilateral   Bicep PRE with yellow t band x 10, bilateral            POM POM lumbrical pinches with splint        x10 bilateral                               HEP instruction                       hand dexterity exercises  cane exercises x 10, twice/day               Therapeutic Activity                            functional activity- scooping sens bin #3 into pan bilateral 8 min           Neuromuscular Re-education                                                             Modalities                    Moist heat      3 min  3 min                                   Assessment: Patient tolerated standing 3 min x 3 for dynamic activities. Pain reported in right shoulder with passive stretching in flexion and abduction. Patient feeling adequately challenged with today's activities      Goals:   Pt will be at supervision level and compliant with comprehensive HEP to maintain progress achieved in OT. Patient sensation in right hand to be assessed with Catrina Hussar monofilaments. ...(achieved)  Patient will demonstrate increase in right  strength to at least 20 lbs for ease in opening containers. Patient will demonstrate improved hand dexterity with decrease in 9 hole peg test time by 10 secs for ease in writing. Patient will demonstrate increase in right shoulder flexion to at least 90 degrees, abduction to 70 degrees for ease in feeding self      Plan: Continue to work on improving AROM and strength for ease in functional reaching.     Charges: MT, TE2   Total Timed Treatment: 45 min  Total Treatment Time: 45 min

## 2022-03-18 ENCOUNTER — APPOINTMENT (OUTPATIENT)
Dept: OCCUPATIONAL MEDICINE | Facility: HOSPITAL | Age: 82
End: 2022-03-18
Attending: INTERNAL MEDICINE
Payer: MEDICARE

## 2022-03-18 ENCOUNTER — APPOINTMENT (OUTPATIENT)
Dept: SPEECH THERAPY | Facility: HOSPITAL | Age: 82
End: 2022-03-18
Attending: INTERNAL MEDICINE
Payer: MEDICARE

## 2022-03-21 ENCOUNTER — OFFICE VISIT (OUTPATIENT)
Dept: OCCUPATIONAL MEDICINE | Facility: HOSPITAL | Age: 82
End: 2022-03-21
Attending: INTERNAL MEDICINE
Payer: MEDICARE

## 2022-03-21 PROCEDURE — 97140 MANUAL THERAPY 1/> REGIONS: CPT | Performed by: OCCUPATIONAL THERAPIST

## 2022-03-21 PROCEDURE — 97110 THERAPEUTIC EXERCISES: CPT | Performed by: OCCUPATIONAL THERAPIST

## 2022-03-21 NOTE — PROGRESS NOTES
Dx:    Tremors of nervous system (R25.1)  Worsened handwriting (R27.8)  Hand weakness (R29.89     Authorized # of Visits: 6        Next MD visit: none scheduled  Fall Risk: standard         Precautions: fall risk , difficulty vocalizing     Medication Changes since last visit?: No  Subjective: Patient reports she is feeling tired today. Objective: Treatment began with shoulder PROM follow by scapula and shoulder A/PROM, two point pinch, light intrinsic hand strengthening. See flow sheet for details.       Date 02/17/22 03/03/22 03/11/22 03/14/22 03//21/22         Visit # 1  2  3  4  5                            Evaluation x                Manual                   PROM shoulder   bilateral shoulder flexion/ext, abduciton5 min  right shoulder flexion/ext, abduciton5 min  STM right bicep 5 min  STM right bicep 5 min         Scapula mobilization right    3 min  3 min  3 min  3 min                semmes Josiah assessment of volar fingertips             Ther ex                   Cane shoulder, flex/ext, elbow flex/ext, abduction, circles   x10 each  scapula AROM- shoulder shrugs, retraction/protraction x 10 each  scapula AROM- shoulder shrugs, retraction/protraction x 10 each  scapula AROM- shoulder shrugs, retraction/protraction x 10 each         Two point pinch velcro checkers   bilateral 15 checkers each  right hand 40 checkers  standing to place 15 yellow, red Clothespins  standing to place 15 yellow, red Clothespins          reaching across midline to place checkers on tree    x10 per side  x10 right side  Standing t remove 15, yellow, red Clothespins with left        Standing t remove 15, yellow, red Clothespins with left             yellow putty,  and two point pinch    bilateral hand 5 min  right shoulder AAROM flexion/extension, abduction/adduction x 10  yellow t band IR/ER x10  Bilateral  yellow t band IR/ER x10  Bilateral          blue sponge,  and rotate    x5 bilateral   Bicep PRE with yellow t band x 10, bilateral  Bicep PRE with yellow t band x 10, bilateral          POM POM lumbrical pinches with splint        x10 bilateral  Two point pinch velcro checkers x40 with right hand          Velcro checkers, three point pinch of red CP to replace velcro checkers          x40         HEP instruction                       hand dexterity exercises  cane exercises x 10, twice/day               Therapeutic Activity                            functional activity- scooping sens bin #3 into pan bilateral 8 min           Neuromuscular Re-education                                                             Modalities                    Moist heat      3 min  3 min  3 min                                 Assessment: Patient moving slowly today, acknowledged she is feeling tired. Required assistance to go from sit to stand, Time Dav[place]. Patient's daughter in law reported Luther Gramajo had neuro consult at [facility] last week for testing. Goals:   Pt will be at supervision level and compliant with comprehensive HEP to maintain progress achieved in OT. Patient sensation in right hand to be assessed with Rosalind Borja monofilaments. ...(achieved)  Patient will demonstrate increase in right  strength to at least 20 lbs for ease in opening containers. Patient will demonstrate improved hand dexterity with decrease in 9 hole peg test time by 10 secs for ease in writing. Patient will demonstrate increase in right shoulder flexion to at least 90 degrees, abduction to 70 degrees for ease in feeding self      Plan: Continue to work on improving AROM and strength for ease in functional reaching.  Reevaluate next session    Charges: MT, ANISH2   Total Timed Treatment: 45 min  Total Treatment Time: 45 min

## 2022-03-22 ENCOUNTER — APPOINTMENT (OUTPATIENT)
Dept: PHYSICAL THERAPY | Facility: HOSPITAL | Age: 82
End: 2022-03-22
Attending: INTERNAL MEDICINE
Payer: MEDICARE

## 2022-03-23 ENCOUNTER — APPOINTMENT (OUTPATIENT)
Dept: OCCUPATIONAL MEDICINE | Facility: HOSPITAL | Age: 82
End: 2022-03-23
Attending: INTERNAL MEDICINE
Payer: MEDICARE

## 2022-03-23 ENCOUNTER — LAB ENCOUNTER (OUTPATIENT)
Dept: LAB | Facility: HOSPITAL | Age: 82
End: 2022-03-23
Attending: INTERNAL MEDICINE
Payer: MEDICARE

## 2022-03-23 DIAGNOSIS — N39.0 URINARY TRACT INFECTION, SITE NOT SPECIFIED: Primary | ICD-10-CM

## 2022-03-23 LAB
BILIRUB UR QL: NEGATIVE
COLOR UR: YELLOW
GLUCOSE UR-MCNC: NEGATIVE MG/DL
HGB UR QL STRIP.AUTO: NEGATIVE
KETONES UR-MCNC: NEGATIVE MG/DL
LEUKOCYTE ESTERASE UR QL STRIP.AUTO: NEGATIVE
NITRITE UR QL STRIP.AUTO: NEGATIVE
PH UR: 9 [PH] (ref 5–8)
PROT UR-MCNC: NEGATIVE MG/DL
SP GR UR STRIP: 1.01 (ref 1–1.03)
UROBILINOGEN UR STRIP-ACNC: <2
VIT C UR-MCNC: NEGATIVE MG/DL

## 2022-03-23 PROCEDURE — 81003 URINALYSIS AUTO W/O SCOPE: CPT

## 2022-03-24 ENCOUNTER — OFFICE VISIT (OUTPATIENT)
Dept: PHYSICAL THERAPY | Facility: HOSPITAL | Age: 82
End: 2022-03-24
Attending: INTERNAL MEDICINE
Payer: MEDICARE

## 2022-03-24 PROCEDURE — 97530 THERAPEUTIC ACTIVITIES: CPT

## 2022-03-24 PROCEDURE — 97110 THERAPEUTIC EXERCISES: CPT

## 2022-03-24 NOTE — PROGRESS NOTES
Diagnosis: difficulty walking, decreased tolerance to activity       Visit (# authorized):  8 per [facility])                        Referring Physician: Yaneli Fajardo    Precautions: at risk of falls     Medication changes since last visit?:  Yes stopped donepezil and mirazapine     Subjective: sustained a fall last week Monday getting out of a chair (the one with stand assist). Family member believes that her feet slid out from under her. She was not injured though remained on floor for an estimated hour- now wears an emergency call light. Recently diagnosed with PSP at [facility]. Medications adjusted in hopes of improving symptoms - per chart review and family report. Objective:    Date  3/1 3/8 3/10 3/24       Visit Number  2 3 4 5       NMR  x         Ther EX x x x x       Ther Act   x x       Gait Training           CRM            Manual             Additional Treatment Information:    Therapeutic Exercise (12 mins):   Nu-step BLE only x10 mins level 4- goal direction based on SPM+ seated rest     Therapeutic activity (28 mins):   Sit<>stand training:    Seated in w/c practice scooting forward in chair- VCs for hand placement    Practice anterior wt shift by placing/picking up cones on floor (5) -2 bouts    PT in front assist through stick to promote anterior wt shift with full sit <>stand  Multiple reps with and without tactile cues at back to promote maintenance of wt shift  Family training provided re: short, simple cues and allowing time for processing. Steps for proper sit <>stand and successful cues discussed. [address] [email or web address] kitchen chair with arms to improve level of safety. Patient Education:  As above. Assessment:   Pt requiring increased time for processing and motor planning. Very slow movement patterns resulting in increased time required to complete tasks. Pt's family verbalizes understanding of material taught today.  Pt with improved sit to stand by end of session will need to assess for carryover next visit    Plan:review sit <>stand with possible less cueing?  in future sessions:continue shuttle,continue upper trunk rotations, standing tasks unsupported    Charges: 1 TE, 2 TA       Total Timed Treatment: 40 min  Total Treatment Time: 44 min

## 2022-03-25 ENCOUNTER — APPOINTMENT (OUTPATIENT)
Dept: OCCUPATIONAL MEDICINE | Facility: HOSPITAL | Age: 82
End: 2022-03-25
Attending: INTERNAL MEDICINE
Payer: MEDICARE

## 2022-03-25 ENCOUNTER — OFFICE VISIT (OUTPATIENT)
Dept: SPEECH THERAPY | Facility: HOSPITAL | Age: 82
End: 2022-03-25
Attending: INTERNAL MEDICINE
Payer: MEDICARE

## 2022-03-25 DIAGNOSIS — R47.89 OTHER SPEECH DISTURBANCE: ICD-10-CM

## 2022-03-25 PROCEDURE — 92507 TX SP LANG VOICE COMM INDIV: CPT

## 2022-03-25 NOTE — PROGRESS NOTES
THERAPY SESSION NOTE     Diagnosis: Other speech disturbance (R47.89) Dysarthria  Authorized # of Visits: 8   # Visits Remainin  Certification Ends:            Fall Risk: standard        Precautions: Covid PPE  Pain Rating: pt reports no pain 0/10  Subjective: Pt arrived on time to session. Friendly and cooperative demeanor. Went to neurologist at [facility] and was diagnosed with progressive supranuclear palsy. Objective:   Speech and Voice Therapy    Date: 22  Tx#: 2 Date: 22  Tx#: 3 Date:   Tx#: 4 Date: Tx#:  Date: Tx#:    MPT MPT 15 sec with range of 13-15, when cued for diaphragmatic breath and increased intensity, MPT decreased to 13 sec with range of 10-13  NL for adult females is 15-25 seconds MPT - 12 seconds        Intensity Average intensity is 70 dB SPL   Pt given breaks when reporting SOB, SPO2 was measured and was observed >95% t/o session  Stating name in conversation - 61   Sustaining /a/ - avg 65 dB  CV, cues t/o for diaphragmatic breathing and voice projection/visualization  Pt reported it felt like a lot of effort   CV/M - avg 71.6 dB range 68-80 (2 trials of 5 vowels)  CV/B - avg 68 dB range 68-75 Intensity   Sustaining - 73 dB (avg)  CV/M - 72 dB  CV/B - 77 dB   Days of Week - 67dB   Name - 64dB    Cues for breathing t/o. Extremely decreased initiation of response when cued. For this reason, other goals were not able to be targeted. Pt reports difficulty deep breathing d/t scoliosis Sustained vowels - 68 dB  CV-M - 75 dB  CV-B - 73 dB  Days of Week - 67 dB  Months of Year - 62 dB  Return to sustained /a/  - 66 dB  Return to CV-M - 79 dB  Name - 67 dB    Frequent breaks required.  Pt emotionally labile, reporting she is frustrated with her speech      Intensity, Articulation while Reading           WE, R/C Tasks    Completing sentences via single words VF3- 100%  Completing sentences via phrases VF3 - 75%     WF Tasks          WF Strategies    Gestures to family member to answer Q's for her during communication breakdowns        Assessment: Reduced intensity across tasks. Required breaks in b/w d/t emotional lability, frustration. Assigned R/C, WF, CV and days of week, months of year, name for HEP. Goals: (to be met in 12 visits)    Pt will approximate max phonation time to functional limits. Pt will maintain adequate intensity t/o structured speech tasks with minimal cues. Pt will maintain adequate intensity and articulatory accuracy t/o structured reading tasks at the word and phrase level with minimal assistance. Pt will complete sentence-level WE and R/C tasks with 80% accuracy independently. Pt will complete basic-mod word finding tasks with minimal assisstance. Pt will utilize word finding strategies t/o conversation in 80% of opportunities with minimal assistance. Plan: Continue therapy per goals above. Continue HEP tasks daily.     Skilled Services: Speech Therapy    Charges: [id number]         Total Treatment Time: 45 min    Selene Teague MS CF-SLP   Speech Language Pathologist   [facility]   [phone]

## 2022-03-28 ENCOUNTER — OFFICE VISIT (OUTPATIENT)
Dept: OCCUPATIONAL MEDICINE | Facility: HOSPITAL | Age: 82
End: 2022-03-28
Attending: INTERNAL MEDICINE
Payer: MEDICARE

## 2022-03-28 PROCEDURE — 97110 THERAPEUTIC EXERCISES: CPT | Performed by: OCCUPATIONAL THERAPIST

## 2022-03-28 PROCEDURE — 97140 MANUAL THERAPY 1/> REGIONS: CPT | Performed by: OCCUPATIONAL THERAPIST

## 2022-03-28 PROCEDURE — 97530 THERAPEUTIC ACTIVITIES: CPT | Performed by: OCCUPATIONAL THERAPIST

## 2022-03-28 NOTE — PROGRESS NOTES
Dx:    Tremors of nervous system (R25.1)  Worsened handwriting (R27.8)  Hand weakness (R29.89     Authorized # of Visits: 6        Next MD visit: none scheduled  Fall Risk: standard         Precautions: fall risk , difficulty vocalizing     Medication Changes since last visit?: No  Subjective: Patient indicated right shoulder feeling \"OK\" today prior to session. Objective: Treatment began with shoulder PROM follow by scapula and shoulder A/PROM, two point pinch, light intrinsic hand strengthening, functional reaching activity. . See flow sheet for details.   Measurements:  Right shoulder flexion: 80 degrees,   Left shoulder flexion: 90 degrees      Date 02/17/22 03/03/22 03/11/22 03/14/22 03//21/22 03/28/22       Visit # 1  2  3  4  5  6                          Evaluation x                Manual                   PROM shoulder   bilateral shoulder flexion/ext, abduciton5 min  right shoulder flexion/ext, abduciton5 min  STM right bicep 5 min  STM right bicep 5 min  STM right bicep 5 min       Scapula mobilization right    3 min  3 min  3 min  3 min  3 min              semmes Josiah assessment of volar fingertips      STM bilateral hands 5 min       Ther ex                   Cane shoulder, flex/ext, elbow flex/ext, abduction, circles   x10 each  scapula AROM- shoulder shrugs, retraction/protraction x 10 each  scapula AROM- shoulder shrugs, retraction/protraction x 10 each  scapula AROM- shoulder shrugs, retraction/protraction x 10 each  digit abduction/adduction x 10       Two point pinch velcro checkers   bilateral 15 checkers each  right hand 40 checkers  standing to place 15 yellow, red Clothespins  standing to place 15 yellow, red Clothespins          reaching across midline to place checkers on tree    x10 per side  x10 right side  Standing t remove 15, yellow, red Clothespins with left        Standing t remove 15, yellow, red Clothespins with left             yellow putty,  and two point pinch bilateral hand 5 min  right shoulder AAROM flexion/extension, abduction/adduction x 10  yellow t band IR/ER x10  Bilateral  yellow t band IR/ER x10  Bilateral  yellow [place] IR/ER x 10, bilateral         blue sponge,  and rotate    x5 bilateral   Bicep PRE with yellow t band x 10, bilateral  Bicep PRE with yellow t band x 10, bilateral  Bicep PRE with yellow t band x 10, bilateral        POM POM lumbrical pinches with splint        x10 bilateral  Two point pinch velcro checkers x40 with right hand  Two point pinch velcro checkers x40 with right espinoza        Velcro checkers, three point pinch of red CP to replace velcro checkers          x40  x40       HEP instruction                       hand dexterity exercises  cane exercises x 10, twice/day               Therapeutic Activity                            functional activity- scooping sens bin #3 into pan bilateral 8 min    Functional activity- standing while scooping sens bin # into pitcher with right then left 5 min standing       Neuromuscular Re-education                                  Sensory bin #3, feeling for 10 large pocker chips , bilateral standing for 5 min                            Modalities                    Moist heat      3 min  3 min  3 min  3 min                               Assessment: Patient daughter reported results of Neurology consult at [facility] indicated new diagnosis of Progressive Supranuclear Palsy. MD note from [facility] indicates symptoms of slowness and imbalance may be caused by condition or medication may be contributing as well. MD recommended tapering off of donepezil and mirazapine. Patient continues to demonstrate delay in processing and initiating activities. Reevaluate next session. Goals:   Pt will be at supervision level and compliant with comprehensive HEP to maintain progress achieved in OT. Patient sensation in right hand to be assessed with [place] monofilaments. ...(achieved)  Patient will demonstrate increase in right  strength to at least 20 lbs for ease in opening containers. Patient will demonstrate improved hand dexterity with decrease in 9 hole peg test time by 10 secs for ease in writing. Patient will demonstrate increase in right shoulder flexion to at least 90 degrees, abduction to 70 degrees for ease in feeding self      Plan: Continue to work on improving AROM and strength for ease in functional reaching.  Reevaluate next session    Charges: MT, TE,TA   Total Timed Treatment: 43 min  Total Treatment Time: 45 min

## 2022-03-29 ENCOUNTER — APPOINTMENT (OUTPATIENT)
Dept: PHYSICAL THERAPY | Facility: HOSPITAL | Age: 82
End: 2022-03-29
Attending: INTERNAL MEDICINE
Payer: MEDICARE

## 2022-03-30 ENCOUNTER — LAB ENCOUNTER (OUTPATIENT)
Dept: LAB | Facility: HOSPITAL | Age: 82
End: 2022-03-30
Attending: INTERNAL MEDICINE
Payer: MEDICARE

## 2022-03-30 DIAGNOSIS — E87.6 HYPOKALEMIA: ICD-10-CM

## 2022-03-30 DIAGNOSIS — E55.9 VITAMIN D DEFICIENCY: Primary | ICD-10-CM

## 2022-03-30 DIAGNOSIS — M79.10 MYALGIA: ICD-10-CM

## 2022-03-30 LAB
MAGNESIUM SERPL-MCNC: 2.6 MG/DL (ref 1.6–2.6)
POTASSIUM SERPL-SCNC: 3.9 MMOL/L (ref 3.5–5.1)
VIT D+METAB SERPL-MCNC: 26.6 NG/ML (ref 30–100)

## 2022-03-30 PROCEDURE — 82306 VITAMIN D 25 HYDROXY: CPT

## 2022-03-30 PROCEDURE — 83735 ASSAY OF MAGNESIUM: CPT

## 2022-03-30 PROCEDURE — 84132 ASSAY OF SERUM POTASSIUM: CPT

## 2022-03-30 PROCEDURE — 36415 COLL VENOUS BLD VENIPUNCTURE: CPT

## 2022-03-31 ENCOUNTER — OFFICE VISIT (OUTPATIENT)
Dept: PHYSICAL THERAPY | Facility: HOSPITAL | Age: 82
End: 2022-03-31
Attending: INTERNAL MEDICINE
Payer: MEDICARE

## 2022-03-31 PROCEDURE — 97112 NEUROMUSCULAR REEDUCATION: CPT

## 2022-03-31 PROCEDURE — 97530 THERAPEUTIC ACTIVITIES: CPT

## 2022-03-31 PROCEDURE — 97110 THERAPEUTIC EXERCISES: CPT

## 2022-03-31 NOTE — PROGRESS NOTES
Diagnosis: difficulty walking, decreased tolerance to activity       Visit (# authorized):  8 per [facility])                        Referring Physician: Marian Ambriz    Precautions: at risk of falls     Medication changes since last visit?:  Yes stopped donepezil and mirazapine     Subjective:Bought a kitchen chair, family notes she is trying to pull self up using underside of table. Objective:    Date  3/1 3/8 3/10 3/24 3/31      Visit Number  2 3 4 5 6      NMR  x   x      Ther EX x x x x x      Ther Act   x x x      Gait Training           CRM            Manual             Additional Treatment Information:    Therapeutic Exercise (12 mins):   Nu-step BLE only x10 mins level 4- goal direction based on SPM+ seated rest     Therapeutic activity (15 mins):   Sit<>stand training:    PT in front assist through stick to promote anterior wt shift with full sit <>stand  Multiple reps with and without tactile cues at back to promote maintenance of wt shift  Family training provided re: short, simple cues and allowing time for processing. Steps for proper sit <>stand and successful cues discussed. NMR (12 mins):   Ambulation weaving around cones (5)- 4 bouts supervision   Ambulation with dual motor task (ball on cup) -4 bouts supervision   Ambulation stepping over obstacles (4)- 2  Bouts, supervision    Patient Education:  As above. Assessment:   Pt requiring increased time for processing and motor planning. Very slow movement patterns resulting in increased time required to complete tasks. Pt with much improve sit<>Stand compared to previous sessions with demonstration of carryover. Pt did become emotionally labile during session a few times but was easily redirected.      Plan:  in future sessions:continue shuttle,continue upper trunk rotations, standing tasks unsupported    Charges: 1 TE, 1 TA, 1 NMR       Total Timed Treatment: 39 min  Total Treatment Time: 40 min

## 2022-04-01 ENCOUNTER — APPOINTMENT (OUTPATIENT)
Dept: OCCUPATIONAL MEDICINE | Facility: HOSPITAL | Age: 82
End: 2022-04-01
Attending: INTERNAL MEDICINE
Payer: MEDICARE

## 2022-04-01 ENCOUNTER — OFFICE VISIT (OUTPATIENT)
Dept: SPEECH THERAPY | Facility: HOSPITAL | Age: 82
End: 2022-04-01
Attending: INTERNAL MEDICINE
Payer: MEDICARE

## 2022-04-01 DIAGNOSIS — R47.89 OTHER SPEECH DISTURBANCE: ICD-10-CM

## 2022-04-01 PROCEDURE — 92507 TX SP LANG VOICE COMM INDIV: CPT

## 2022-04-01 NOTE — PROGRESS NOTES
THERAPY SESSION NOTE     Diagnosis: Other speech disturbance (R47.89) Dysarthria  Authorized # of Visits: 8   # Visits Remaining: 3  Certification Ends: [id number]/2683           Fall Risk: standard        Precautions: Covid PPE  Pain Rating: pt reports no pain 0/10  Subjective: Pt arrived on time to session. Friendly and cooperative demeanor. Objective:   Speech and Voice Therapy    Date: 02/22/22  Tx#: 2 Date: 03/04/22  Tx#: 3 Date: 03/35/22  Tx#: 4 Date: 04/01/22  Tx#: 5   MPT MPT 15 sec with range of 13-15, when cued for diaphragmatic breath and increased intensity, MPT decreased to 13 sec with range of 10-13  NL for adult females is 15-25 seconds MPT - 12 seconds   MPT - 8 seconds with range of 5-8 sec    Intensity Average intensity is 70 dB SPL   Pt given breaks when reporting SOB, SPO2 was measured and was observed >95% t/o session  Stating name in conversation - 61   Sustaining /a/ - avg 65 dB  CV, cues t/o for diaphragmatic breathing and voice projection/visualization  Pt reported it felt like a lot of effort   CV/M - avg 71.6 dB range 68-80 (2 trials of 5 vowels)  CV/B - avg 68 dB range 68-75 Intensity   Sustaining - 73 dB (avg)  CV/M - 72 dB  CV/B - 77 dB   Days of Week - 67dB   Name - 64dB    Cues for breathing t/o. Extremely decreased initiation of response when cued. For this reason, other goals were not able to be targeted. Pt reports difficulty deep breathing d/t scoliosis Sustained vowels - 68 dB  CV-M - 75 dB  CV-B - 73 dB  Days of Week - 67 dB  Months of Year - 62 dB  Return to sustained /a/  - 66 dB  Return to CV-M - 79 dB  Name - 67 dB    Frequent breaks required. Pt emotionally labile, reporting she is frustrated with her speech Decibel measurement not possible d/t equipment malfunction.  However, pt perceptually sounded louder across tasks   CV-M completed  CV-B completed  Days of Week completed 2/7 adequate intensity  Counting - 2/10 adequate intensity    Intensity, Articulation while Reading Varied 1 syllable and 2 syllable words   30% with adequate intensity    WE, R/C Tasks    Completing sentences via single words VF3- 100%  Completing sentences via phrases VF3 - 75% R/C VF 2 - 80%    WF Tasks      HEP - Completing sentences - required assistance from granddaugher  Pt's granddaughter reports \"it was hard for her to find the words sometimes\"   WF Strategies    Gestures to family member to answer Q's for her during communication breakdowns       Assessment: Improvement from last week. Increased intensity/stimulability across tasks. Assigned R/C, WF, CV and days of week, months of year, name for HEP. Goals: (to be met in 12 visits)    Pt will approximate max phonation time to functional limits. Pt will maintain adequate intensity t/o structured speech tasks with minimal cues. Pt will maintain adequate intensity and articulatory accuracy t/o structured reading tasks at the word and phrase level with minimal assistance. Pt will complete sentence-level WE and R/C tasks with 80% accuracy independently. Pt will complete basic-mod word finding tasks with minimal assisstance. Pt will utilize word finding strategies t/o conversation in 80% of opportunities with minimal assistance. Plan: Continue therapy per goals above. Continue HEP tasks daily.     Skilled Services: Speech Therapy    Charges: [id number]         Total Treatment Time: 45 min    Neftali Naqvi MS CF-SLP   Speech Language Pathologist   [facility]  [address]. [phone]

## 2022-04-04 ENCOUNTER — APPOINTMENT (OUTPATIENT)
Dept: OCCUPATIONAL MEDICINE | Facility: HOSPITAL | Age: 82
End: 2022-04-04
Attending: INTERNAL MEDICINE
Payer: MEDICARE

## 2022-04-04 ENCOUNTER — OFFICE VISIT (OUTPATIENT)
Dept: SPEECH THERAPY | Facility: HOSPITAL | Age: 82
End: 2022-04-04
Attending: INTERNAL MEDICINE
Payer: MEDICARE

## 2022-04-04 PROCEDURE — 92507 TX SP LANG VOICE COMM INDIV: CPT

## 2022-04-04 NOTE — PROGRESS NOTES
THERAPY SESSION NOTE     Diagnosis: Other speech disturbance (R47.89) Dysarthria  Authorized # of Visits: 8   # Visits Remainin  Certification Ends:            Fall Risk: standard        Precautions: Covid PPE  Pain Rating: pt reports no pain 0/10  Subjective: Pt arrived on time to session. Friendly and cooperative demeanor. Objective:   Speech and Voice Therapy    Date: 22  Tx#: 2 Date: 22  Tx#: 3 Date:   Tx#: 4 Date: 22  Tx#: 5 Date: 22  Tx#: 6   MPT MPT 15 sec with range of 13-15, when cued for diaphragmatic breath and increased intensity, MPT decreased to 13 sec with range of 10-13  NL for adult females is 15-25 seconds MPT - 12 seconds   MPT - 8 seconds with range of 5-8 sec  MPT - 10 seconds with a range of 7-10 seconds   Intensity Average intensity is 70 dB SPL   Pt given breaks when reporting SOB, SPO2 was measured and was observed >95% t/o session  Stating name in conversation - 60   Sustaining /a/ - avg 65 dB  CV, cues t/o for diaphragmatic breathing and voice projection/visualization  Pt reported it felt like a lot of effort   CV/M - avg 71.6 dB range 68-80 (2 trials of 5 vowels)  CV/B - avg 68 dB range 68-75 Intensity   Sustaining - 73 dB (avg)  CV/M - 72 dB  CV/B - 77 dB   Days of Week - 67dB   Name - 64dB    Cues for breathing t/o. Extremely decreased initiation of response when cued. For this reason, other goals were not able to be targeted. Pt reports difficulty deep breathing d/t scoliosis Sustained vowels - 68 dB  CV-M - 75 dB  CV-B - 73 dB  Days of Week - 67 dB  Months of Year - 62 dB  Return to sustained /a/  - 66 dB  Return to CV-M - 79 dB  Name - 67 dB    Frequent breaks required. Pt emotionally labile, reporting she is frustrated with her speech Decibel measurement not possible d/t equipment malfunction.  However, pt perceptually sounded louder across tasks   CV-M completed  CV-B completed  Days of Week completed 2/7 adequate intensity  Counting - 2/10 adequate intensity  CV-M completed 100%  CV-B completed 100%  Name - 50%  Kids' names (Tiburcio Le, Alexandra Martin) x5 each  Grandkids' names (5/10 - Evelyn Del Angel) x 5 each    Intensity, Articulation while Reading     Varied 1 syllable and 2 syllable words   30% with adequate intensity  Adequate intensity + accurate articulation  2 syllable words 95%  3 syllable words 53%   WE, R/C Tasks    Completing sentences via single words VF3- 100%  Completing sentences via phrases VF3 - 75% R/C VF 2 - 80%  HEP R/C VF 3 -   Choosing opposites 83%, 92%, 100%  Choosing Synonyms - 92%, 83%, 83%   WF Tasks      HEP - Completing sentences - required assistance from granddaugher  Pt's granddaughter reports \"it was hard for her to find the words sometimes\"    WF Strategies    Gestures to family member to answer Q's for her during communication breakdowns        Assessment: Improvement across tasks. Some errors with R/C VF3. Assigned R/C, WF, CV and days of week, months of year, functional names for HEP. Goals: (to be met in 12 visits)    Pt will approximate max phonation time to functional limits. Pt will maintain adequate intensity t/o structured speech tasks with minimal cues. Pt will maintain adequate intensity and articulatory accuracy t/o structured reading tasks at the word and phrase level with minimal assistance. Pt will complete sentence-level WE and R/C tasks with 80% accuracy independently. Pt will complete basic-mod word finding tasks with minimal assisstance. Pt will utilize word finding strategies t/o conversation in 80% of opportunities with minimal assistance. Plan: Continue therapy per goals above. Continue HEP tasks daily.     Skilled Services: Speech Therapy    Charges: [id number]         Total Treatment Time: 45 min    Jaquelin Jacobson MS CF-SLP   Speech Language Pathologist   [address] [facility]   [phone]

## 2022-04-05 ENCOUNTER — OFFICE VISIT (OUTPATIENT)
Dept: PHYSICAL THERAPY | Facility: HOSPITAL | Age: 82
End: 2022-04-05
Attending: INTERNAL MEDICINE
Payer: MEDICARE

## 2022-04-05 PROCEDURE — 97530 THERAPEUTIC ACTIVITIES: CPT

## 2022-04-05 PROCEDURE — 97110 THERAPEUTIC EXERCISES: CPT

## 2022-04-05 PROCEDURE — 97112 NEUROMUSCULAR REEDUCATION: CPT

## 2022-04-05 NOTE — PROGRESS NOTES
Diagnosis: difficulty walking, decreased tolerance to activity       Visit (# authorized):  8 per [facility])                        Referring Physician: Mandeep Schroeder    Precautions: at risk of falls     Medication changes since last visit?:  No     Subjective: No new information to report. Denies falls since she was last seen. Objective:    Date  3/1 3/8 3/10 3/24 3/31 4/5      Visit Number  2 3 4 5 6 7     NMR  x   x x     Ther EX x x x x x x     Ther Act   x x x x     Gait Training           CRM            Manual             Additional Treatment Information:    Therapeutic Exercise (20 mins):   Nu-step BLE only x10 mins level 4- goal direction based on SPM+ seated rest   Shuttle    B press (2 blue) 3x10     Therapeutic activity (8 mins):   Sit<>stand: performed multiple bouts throughout session with minimal VC's; reviewed with family member present. NMR (10 mins):   Standing unsupported engaged in UE activity reaching in out of LORENZA (across midline)    Patient Education:  As above. Assessment:   Pt requiring increased time for processing and motor planning but quality of movement overall improved. Pt with improved carryover of sit<>stand sequencing/performance.      Plan:  in future sessions:continue shuttle,continue upper trunk rotations, standing tasks unsupported    Charges: 1 TE, 1 TA, 1 NMR       Total Timed Treatment: 38 min  Total Treatment Time: 40 min

## 2022-04-07 ENCOUNTER — OFFICE VISIT (OUTPATIENT)
Dept: PHYSICAL THERAPY | Facility: HOSPITAL | Age: 82
End: 2022-04-07
Attending: INTERNAL MEDICINE
Payer: MEDICARE

## 2022-04-07 PROCEDURE — 97112 NEUROMUSCULAR REEDUCATION: CPT

## 2022-04-07 PROCEDURE — 97110 THERAPEUTIC EXERCISES: CPT

## 2022-04-07 NOTE — PROGRESS NOTES
Progress Summary    Referring Physician: Niall Gallegos    Diagnosis: difficulty walking, decreased tolerance to activity        Pt has attended 8 visits in Physical Therapy. Subjective: Pt has found PT helpful with improvement in balance and ability to perform sit<>stand. She is motivated to continue PT at this time. Family agrees that she is benefiting from PT with improvement in mobility with good carryover into home. She reports she has not been doing her exercises at home but her family member does state they have been working on providing the recommended cues during sit<>stand. Assessment: Pt demonstrates significant improvement in all functional outcome measures. Though she is still considered at risk of falls based on BBS this is likely her peak score and no further improvement is expected. It is expected that her TUG will continue to improve as her sit<>stand continues to improve however. Pt demonstrates improved level of safety with sit<>stand though continues to require cues and SBA for proper performance. She is demonstrating good carryover session to session and has potential to progress to performance without VC's. Goals have been updated to reflect her progress. Pt and family verbalize understanding of material taught today and are in agreement with plan. Tara Pierce would benefit from continued skilled PT intervention in order to further progress level of safety and independence with functional tasks to reduce risk of falls as well as further provide family training and continue to develop HEP in preparation for future DC. Objective:   *values from initial testing documented in parenthesis below    Stewart Balance Scale     Item Description Score (0 worst-4 best)    ___3___1. Sitting to standing  ___4___2. Standing unsupported   ___4___3. Sitting unsupported   ___3___4. Standing to sitting   ___4___5. Transfers   ___4___6. Standing with eyes closed   ___4___7. Standing with feet together   ___2___8. Reaching forward with outstretched arm   ___2___9. Retrieving object from floor   ___2__10. Turning to look behind   ___2__11. Turning 360 degrees   ___2__12. Placing alternate foot on stool   ___2__13. Standing with one foot in front   ___1__14. Standing on one foot     Total __39_(34)__/56 (less than 46/56 = fall risk)    Narrow LORENZA EC 30 seconds (postural sway WNL)     Timed Up and Go (AD, time): 30.5 seconds no AD supervision (46 sec, no AD- SBA)         2 minute walk test: no  ft. Pt and family educated on progress in therapy and POC moving forward. Reinforced use of short commands with time for pt to respond before another cue is issued. Pt encouraged to increase level of compliance to HEP with rationale for home exercise explained. Family was advised to have someone present to take patient through exercises in order to maximize level of participation and compliance. Goals:   Goals to be met within POC or 90 days:  1. Pt to perform HEP with supervision. Ongoing   2. Pt will improve functional gait speed in order to improve TUG by at least 6 seconds to reflect improved safety with household ambulation Goal met 4/7  3. Pt will improve functional BLE strength in order to perform sit<>stand with BUE independently on first attempt. Goal met 4/7  4. Pt will perform Romberg stance for 30 seconds EC without LOB in order to reflect improved safety with mobility in darker environments such as when she gets up to go to the bathroom at night. IN progress   New goal (4/7): Pt will improve TUG by an additional 10 seconds to reflect reduced risk of falls in home. New goal (4/7): Pt will perform sit<>stand without VC's for sequence in order to improve level of independence in home. Charges: 1 NMR, 2 TE     Total timed treatment: 38 mins  Total treatment time: 42 mins      Rehab Potential: good    Plan: Continue skilled Physical Therapy 1-2 x/week or a total of 5 visits over a 90 day period.  Treatment will include: Balance Training, Gait Training,  Therapeutic Exercises; Neuromuscular Re-education; Therapeutic Activity; Patient education; Home exercise program instruction. Patient/Family/Caregiver was advised of these findings, precautions, and treatment options and has agreed to actively participate in planning and for this course of care. Thank you for your referral. If you have any questions, please contact me at Dept: [phone]. Sincerely,    Heena Sheriff PT, NCS    Electronically signed by therapist: Heena Sheriff PT    Physician's certification required: Yes  Please co-sign or sign and return this letter via fax as soon as possible to [phone]. I certify the need for these services furnished under this plan of treatment and while under my care.     X___________________________________________________ Date____________________    Certification From: 4/3/8116  To:7/6/2022

## 2022-04-08 ENCOUNTER — APPOINTMENT (OUTPATIENT)
Dept: SPEECH THERAPY | Facility: HOSPITAL | Age: 82
End: 2022-04-08
Attending: INTERNAL MEDICINE
Payer: MEDICARE

## 2022-04-08 ENCOUNTER — OFFICE VISIT (OUTPATIENT)
Dept: OCCUPATIONAL MEDICINE | Facility: HOSPITAL | Age: 82
End: 2022-04-08
Attending: INTERNAL MEDICINE
Payer: MEDICARE

## 2022-04-08 PROCEDURE — 97140 MANUAL THERAPY 1/> REGIONS: CPT | Performed by: OCCUPATIONAL THERAPIST

## 2022-04-08 PROCEDURE — 97110 THERAPEUTIC EXERCISES: CPT | Performed by: OCCUPATIONAL THERAPIST

## 2022-04-08 NOTE — PROGRESS NOTES
Dx:    Tremors of nervous system (R25.1)  Worsened handwriting (R27.8)  Hand weakness (R29.89     Authorized # of Visits: 6        Next MD visit: none scheduled  Fall Risk: standard         Precautions: fall risk , difficulty vocalizing     Medication Changes since last visit?: No  Subjective: Patient indicated right shoulder feeling \"OK\" today prior to session. Objective: Treatment began with shoulder PROM follow by scapula and shoulder A/PROM, two point pinch, light intrinsic hand strengthening, functional reaching activity. . See flow sheet for details.           Date 02/17/22 03/03/22 03/11/22 03/14/22 03//21/22 03/28/22 04/0822     Visit # 1  2  3  4  5  6  7                        Evaluation x                Manual                   PROM shoulder   bilateral shoulder flexion/ext, abduciton5 min  right shoulder flexion/ext, abduciton5 min  STM right bicep 5 min  STM right bicep 5 min  STM right bicep 5 min  STM right bicep 5 min     Scapula mobilization right    3 min  3 min  3 min  3 min  3 min  3 min            semmes Josiah assessment of volar fingertips      STM bilateral hands 5 min       Ther ex                   Cane shoulder, flex/ext, elbow flex/ext, abduction, circles   x10 each  scapula AROM- shoulder shrugs, retraction/protraction x 10 each  scapula AROM- shoulder shrugs, retraction/protraction x 10 each  scapula AROM- shoulder shrugs, retraction/protraction x 10 each  digit abduction/adduction x 10  digit abduction/adduction x 10     Two point pinch velcro checkers   bilateral 15 checkers each  right hand 40 checkers  standing to place 15 yellow, red Clothespins  standing to place 15 yellow, red Clothespins    shoulder shrugs and retraction x 10 each with assist      reaching across midline to place checkers on tree    x10 per side  x10 right side  Standing t remove 15, yellow, red Clothespins with left        Standing t remove 15, yellow, red Clothespins with left       Velcro checkers two point pinch 20 each hand      yellow putty,  and two point pinch    bilateral hand 5 min  right shoulder AAROM flexion/extension, abduction/adduction x 10  yellow t band IR/ER x10  Bilateral  yellow t band IR/ER x10  Bilateral  yellow Taband IR/ER x 10, bilateral   composite flexion , grasping potato masher push into red putty      blue sponge,  and rotate    x5 bilateral   Bicep PRE with yellow t band x 10, bilateral  Bicep PRE with yellow t band x 10, bilateral  Bicep PRE with yellow t band x 10, bilateral  Red putty, , twist and pull andpincbh      POM POM lumbrical pinches with splint        x10 bilateral  Two point pinch velcro checkers x40 with right hand  Two point pinch velcro checkers x40 with right espinoza        Velcro checkers, three point pinch of red CP to replace velcro checkers          x40  x40       HEP instruction                       hand dexterity exercises  cane exercises x 10, twice/day               Therapeutic Activity                            functional activity- scooping sens bin #3 into pan bilateral 8 min    Functional activity- standing while scooping sens bin # into pitcher with right then left 5 min standing  Functional activity open and close 5 screw top containers     Neuromuscular Re-education                                  Sensory bin #3, feeling for 10 large pocker chips , bilateral standing for 5 min                            Modalities                    Moist heat      3 min  3 min  3 min  3 min                               Assessment: Patient arrived in wheelchair accompanied by her son. Observed patient required C[facility] to [facility] to chair in clinic. Motivated for therapy today although continues to have episodes of delay in processing instructions and following through with activities. Patient at setup with functional activity, removing and replacing caps.  Patient reports noncompliance with hand strengthening and dexterity exercises with sponge    Goals:   Pt will be at supervision level and compliant with comprehensive HEP to maintain progress achieved in OT. Patient sensation in right hand to be assessed with [place] Sails monofilaments. ...(achieved)  Patient will demonstrate increase in right  strength to at least 20 lbs for ease in opening containers. Patient will demonstrate improved hand dexterity with decrease in 9 hole peg test time by 10 secs for ease in writing. Patient will demonstrate increase in right shoulder flexion to at least 90 degrees, abduction to 70 degrees for ease in feeding self      Plan: Continue to work on improving AROM and strength for ease in functional reaching.  Reevaluate next session    Charges: MT, ANISH2 Total Timed Treatment: 43 min  Total Treatment Time: 45 min

## 2022-04-12 ENCOUNTER — OFFICE VISIT (OUTPATIENT)
Dept: OCCUPATIONAL MEDICINE | Facility: HOSPITAL | Age: 82
End: 2022-04-12
Attending: INTERNAL MEDICINE
Payer: MEDICARE

## 2022-04-12 PROCEDURE — 97110 THERAPEUTIC EXERCISES: CPT | Performed by: OCCUPATIONAL THERAPIST

## 2022-04-12 PROCEDURE — 97140 MANUAL THERAPY 1/> REGIONS: CPT | Performed by: OCCUPATIONAL THERAPIST

## 2022-04-14 ENCOUNTER — OFFICE VISIT (OUTPATIENT)
Dept: PHYSICAL THERAPY | Facility: HOSPITAL | Age: 82
End: 2022-04-14
Attending: INTERNAL MEDICINE
Payer: MEDICARE

## 2022-04-14 PROCEDURE — 97530 THERAPEUTIC ACTIVITIES: CPT

## 2022-04-14 PROCEDURE — 97110 THERAPEUTIC EXERCISES: CPT

## 2022-04-14 PROCEDURE — 97112 NEUROMUSCULAR REEDUCATION: CPT

## 2022-04-14 NOTE — PROGRESS NOTES
Diagnosis: difficulty walking, decreased tolerance to activity       Visit (# authorized):  13 per [facility])                        Referring Physician: Noris Clay    Precautions: at risk of falls     Medication changes since last visit?:  No     Subjective: No new information to report. .     Objective:    Date  3/1 3/8 3/10 3/24 3/31 4/5  4/7  4/14   Visit Number  2 3 4 5 6 7 8 (PN) 9   NMR  x   x x x x   Ther EX x x x x x x x x   Ther Act   x x x x  x   Gait Training           CRM            Manual             Additional Treatment Information:    Therapeutic Exercise (12 mins):   Nu-step BLE only x10 mins level 4- goal direction based on SPM+ seated rest     Therapeutic activity (8 mins):   Sit<>stand: performed multiple bouts throughout session with minimal VC's     NMR (18 mins):   Standing unsupported engaged in UE activity reaching in out of LORENZA (across midline), standing on foam- CGA-SBA intermittent rest breaks   Ambulation around and over obstacles 2 bouts -SBA   Ambulation dual motor task - holding item in both hands- ~65 ft SBA    Assessment:   Pt requiring increased time for processing and motor planning but demo's good postural stability throughout.     Plan:  in future sessions:continue shuttle,continue upper trunk rotations, continue standing tasks unsupported, continue dual tasking     Charges: 1 TE, 1 TA, 1 NMR       Total Timed Treatment: 38 min  Total Treatment Time: 40 min

## 2022-04-15 ENCOUNTER — OFFICE VISIT (OUTPATIENT)
Dept: SPEECH THERAPY | Facility: HOSPITAL | Age: 82
End: 2022-04-15
Attending: INTERNAL MEDICINE
Payer: MEDICARE

## 2022-04-15 DIAGNOSIS — R47.89 OTHER SPEECH DISTURBANCE: ICD-10-CM

## 2022-04-15 PROCEDURE — 92507 TX SP LANG VOICE COMM INDIV: CPT

## 2022-04-15 NOTE — PROGRESS NOTES
THERAPY SESSION NOTE     Diagnosis: Other speech disturbance (R47.89) Dysarthria  Authorized # of Visits: 8   # Visits Remainin  Certification Ends:            Fall Risk: standard        Precautions: Covid PPE  Pain Rating: pt reports no pain 0/10  Subjective: Pt arrived on time to session. Friendly and cooperative demeanor. HEP not completed. Objective:   Speech and Voice Therapy    Date: 22  Tx#: 2 Date: 22  Tx#: 3 Date:   Tx#: 4 Date: 22  Tx#: 5 Date: 22  Tx#: 6 Date: 04/15/22  Tx#: 7   MPT MPT 15 sec with range of 13-15, when cued for diaphragmatic breath and increased intensity, MPT decreased to 13 sec with range of 10-13  NL for adult females is 15-25 seconds MPT - 12 seconds   MPT - 8 seconds with range of 5-8 sec  MPT - 10 seconds with a range of 7-10 seconds    Intensity Average intensity is 70 dB SPL   Pt given breaks when reporting SOB, SPO2 was measured and was observed >95% t/o session  Stating name in conversation - 60   Sustaining /a/ - avg 65 dB  CV, cues t/o for diaphragmatic breathing and voice projection/visualization  Pt reported it felt like a lot of effort   CV/M - avg 71.6 dB range 68-80 (2 trials of 5 vowels)  CV/B - avg 68 dB range 68-75 Intensity   Sustaining - 73 dB (avg)  CV/M - 72 dB  CV/B - 77 dB   Days of Week - 67dB   Name - 64dB    Cues for breathing t/o. Extremely decreased initiation of response when cued. For this reason, other goals were not able to be targeted. Pt reports difficulty deep breathing d/t scoliosis Sustained vowels - 68 dB  CV-M - 75 dB  CV-B - 73 dB  Days of Week - 67 dB  Months of Year - 62 dB  Return to sustained /a/  - 66 dB  Return to CV-M - 79 dB  Name - 67 dB    Frequent breaks required. Pt emotionally labile, reporting she is frustrated with her speech Decibel measurement not possible d/t equipment malfunction.  However, pt perceptually sounded louder across tasks   CV-M completed  CV-B completed  Days of Week completed 2/7 adequate intensity  Counting - 2/10 adequate intensity  CV-M completed 100%  CV-B completed 100%  Name - 50%  Kids' names (General Gunner, Trent Hove) x5 each  Grandkids' names (5/10 - Karon Pilling, Osbaldo Bicker) x5 each  CV-M 100%   CV-B 80%  Name - 80%     Adequate intensity + accurate articulation    Kids' Names - 66%     Grandkids' names Karon Pilling, Dylan, Yennifer, Cara Lesia, Almaraz Bowels) 100%   Intensity, Articulation while Reading     Varied 1 syllable and 2 syllable words   30% with adequate intensity  Adequate intensity + accurate articulation  2 syllable words 95%  3 syllable words 53% Adequate intensity + accurate articulation  1 syllable words   66%  Frustration, required frequent breaks   WE, R/C Tasks    Completing sentences via single words VF3- 100%  Completing sentences via phrases VF3 - 75% R/C VF 2 - 80%  HEP R/C VF 3 -   Choosing opposites 83%, 92%, 100%  Choosing Synonyms - 92%, 83%, 83%    WF Tasks      HEP - Completing sentences - required assistance from granddaugher  Pt's granddaughter reports \"it was hard for her to find the words sometimes\"     WF Strategies    Gestures to family member to answer Q's for her during communication breakdowns    Stimulable to adequate intensity + articulation t/o conversation with cues      Assessment: Frustration towards end of session, clinician provided encouragement and support. Stimulable to increased intensity/articualtion t/o conversation. Assigned functional names for HEP. Goals: (to be met in 12 visits)    Pt will approximate max phonation time to functional limits. Pt will maintain adequate intensity t/o structured speech tasks with minimal cues. Pt will maintain adequate intensity and articulatory accuracy t/o structured reading tasks at the word and phrase level with minimal assistance. Pt will complete sentence-level WE and R/C tasks with 80% accuracy independently.    Pt will complete basic-mod word finding tasks with minimal assisstance. Pt will utilize word finding strategies t/o conversation in 80% of opportunities with minimal assistance. Plan: Continue therapy per goals above. Continue HEP tasks daily.     Skilled Services: Speech Therapy    Charges: [id number]         Total Treatment Time: 45 min    Cathy Knowles MS CF-SLP   Speech Language Pathologist   [facility]   [phone]

## 2022-04-22 ENCOUNTER — OFFICE VISIT (OUTPATIENT)
Dept: SPEECH THERAPY | Facility: HOSPITAL | Age: 82
End: 2022-04-22
Attending: INTERNAL MEDICINE
Payer: MEDICARE

## 2022-04-22 DIAGNOSIS — R47.89 OTHER SPEECH DISTURBANCE: ICD-10-CM

## 2022-04-22 PROCEDURE — 92507 TX SP LANG VOICE COMM INDIV: CPT

## 2022-04-22 NOTE — PROGRESS NOTES
THERAPY SESSION NOTE     Diagnosis: Other speech disturbance (R47.89) Dysarthria  Authorized # of Visits: 8   # Visits Remaining: --  Certification Ends: 09/13/8217           Fall Risk: standard        Precautions: Covid PPE  Pain Rating: pt reports no pain 0/10  Subjective: Pt arrived on time to session. Friendly and cooperative demeanor. HEP completed and returned. Objective:   Speech and Voice Therapy    Date: 02/22/22  Tx#: 2 Date: 03/04/22  Tx#: 3 Date: 03/35/22  Tx#: 4 Date: 04/01/22  Tx#: 5 Date: 04/04/22  Tx#: 6 Date: 04/15/22  Tx#: 7 Date: 04/22/22  Tx#: 8   MPT MPT 15 sec with range of 13-15, when cued for diaphragmatic breath and increased intensity, MPT decreased to 13 sec with range of 10-13  NL for adult females is 15-25 seconds MPT - 12 seconds   MPT - 8 seconds with range of 5-8 sec  MPT - 10 seconds with a range of 7-10 seconds  MPT - 6 seconds with range of 4-6 sec   Intensity Average intensity is 70 dB SPL   Pt given breaks when reporting SOB, SPO2 was measured and was observed >95% t/o session  Stating name in conversation - 60   Sustaining /a/ - avg 65 dB  CV, cues t/o for diaphragmatic breathing and voice projection/visualization  Pt reported it felt like a lot of effort   CV/M - avg 71.6 dB range 68-80 (2 trials of 5 vowels)  CV/B - avg 68 dB range 68-75 Intensity   Sustaining - 73 dB (avg)  CV/M - 72 dB  CV/B - 77 dB   Days of Week - 67dB   Name - 64dB    Cues for breathing t/o. Extremely decreased initiation of response when cued. For this reason, other goals were not able to be targeted. Pt reports difficulty deep breathing d/t scoliosis Sustained vowels - 68 dB  CV-M - 75 dB  CV-B - 73 dB  Days of Week - 67 dB  Months of Year - 62 dB  Return to sustained /a/  - 66 dB  Return to CV-M - 79 dB  Name - 67 dB    Frequent breaks required. Pt emotionally labile, reporting she is frustrated with her speech Decibel measurement not possible d/t equipment malfunction.  However, pt perceptually sounded louder across tasks   CV-M completed  CV-B completed  Days of Week completed 2/7 adequate intensity  Counting - 2/10 adequate intensity  CV-M completed 100%  CV-B completed 100%  Name - 50%  Kids' names (Fitch Pea, Ngoc Roys) x5 each  Grandkids' names (5/10 - Deepak Greek, [place] Hesselbach) x5 each  CV-M 100%   CV-B 80%  Name - 80%     Adequate intensity + accurate articulation    Kids' Names - 66%     Grandkids' names Deepak Zambian, [place] Hesselbach, Yennifer, Lida, Pascual, Jean, Holly) 100% CV-M 100%  CV-B 100%    Name - 80%  Kids' Names - 83%  Grandkids' Names - 50%   Intensity, Articulation while Reading     Varied 1 syllable and 2 syllable words   30% with adequate intensity  Adequate intensity + accurate articulation  2 syllable words 95%  3 syllable words 53% Adequate intensity + accurate articulation  1 syllable words   66%  Frustration, required frequent breaks 1 Syllable words  65%    Conversation - 60%   WE, R/C Tasks    Completing sentences via single words VF3- 100%  Completing sentences via phrases VF3 - 75% R/C VF 2 - 80%  HEP R/C VF 3 -   Choosing opposites 83%, 92%, 100%  Choosing Synonyms - 92%, 83%, 83%  HEP R/C VF3 completing sentences via words - 100%  - via phrases - 100%    R/C Identification -282%  Semantic Expansion - 100%  Identification - 06%   WF Tasks      HEP - Completing sentences - required assistance from granddaugher  Pt's granddaughter reports \"it was hard for her to find the words sometimes\"      WF Strategies    Gestures to family member to answer Q's for her during communication breakdowns    Stimulable to adequate intensity + articulation t/o conversation with cues       Assessment: Increased intelligibility in conversation in quiet environment with cues. Discussed need for video swallow and Speak Out group. Pt is interested. Assigned functional names for HEP.      Goals: (to be met in 12 visits)    Pt will approximate max phonation time to functional limits. Pt will maintain adequate intensity t/o structured speech tasks with minimal cues. Pt will maintain adequate intensity and articulatory accuracy t/o structured reading tasks at the word and phrase level with minimal assistance. Pt will complete sentence-level WE and R/C tasks with 80% accuracy independently. Pt will complete basic-mod word finding tasks with minimal assisstance. Pt will utilize word finding strategies t/o conversation in 80% of opportunities with minimal assistance. Plan: Continue therapy per goals above. Continue HEP tasks daily.     Skilled Services: Speech Therapy    Charges: [id number]         Total Treatment Time: 43 min    Selene Teague MS CF-SLP   Speech Language Pathologist   [facility]   [phone]

## 2022-04-27 ENCOUNTER — OFFICE VISIT (OUTPATIENT)
Dept: PHYSICAL THERAPY | Facility: HOSPITAL | Age: 82
End: 2022-04-27
Attending: INTERNAL MEDICINE
Payer: MEDICARE

## 2022-04-27 PROCEDURE — 97110 THERAPEUTIC EXERCISES: CPT

## 2022-04-27 PROCEDURE — 97112 NEUROMUSCULAR REEDUCATION: CPT

## 2022-04-27 PROCEDURE — 97530 THERAPEUTIC ACTIVITIES: CPT

## 2022-04-27 NOTE — PROGRESS NOTES
Diagnosis: difficulty walking, decreased tolerance to activity       Visit (# authorized):  13 per [facility])                        Referring Physician: Harlan Gomes    Precautions: at risk of falls     Medication changes since last visit?:  No     Subjective: denies falls since she was last seen. Objective:    Date  3/24 3/31 4/5  4/7  4/14 4/27      Visit Number  5 6 7 8 (PN) 9 10      NMR  x x x x x      Ther EX x x x x x x      Ther Act x x x  x x      Gait Training            CRM             Manual              Additional Treatment Information:    Therapeutic Exercise (12 mins):   Nu-step BLE only x10 mins level 5- goal direction based on SPM+ seated rest     Therapeutic activity (8 mins):   Sit<>stand: performed multiple bouts throughout session without VC's -increased time to complete     NMR (18 mins):   Standing unsupported playing catch -SBA, x15   Standing unsupported bending over<>standing up to place/ cones on floor (3) 2 bouts, SBA   Ambulation over compliant/uneven surface including over obstacles and curb negotiation- SBA, HHA for curb negotiation -multiple bouts       Assessment:   Pt requiring increased time for processing and motor planning but demo's good postural stability throughout despite progressed activities.      Plan:  in future sessions:continue shuttle,continue upper trunk rotations, continue standing tasks unsupported, continue dual tasking     Charges: 1 TE, 1 TA, 1 NMR       Total Timed Treatment: 38 min  Total Treatment Time: 40 min

## 2022-04-28 ENCOUNTER — TELEPHONE (OUTPATIENT)
Dept: PHYSICAL THERAPY | Facility: HOSPITAL | Age: 82
End: 2022-04-28

## 2022-04-29 ENCOUNTER — APPOINTMENT (OUTPATIENT)
Dept: SPEECH THERAPY | Facility: HOSPITAL | Age: 82
End: 2022-04-29
Attending: INTERNAL MEDICINE
Payer: MEDICARE

## 2022-05-04 ENCOUNTER — OFFICE VISIT (OUTPATIENT)
Dept: PHYSICAL THERAPY | Facility: HOSPITAL | Age: 82
End: 2022-05-04
Attending: INTERNAL MEDICINE
Payer: MEDICARE

## 2022-05-04 PROCEDURE — 97112 NEUROMUSCULAR REEDUCATION: CPT

## 2022-05-04 PROCEDURE — 97530 THERAPEUTIC ACTIVITIES: CPT

## 2022-05-04 PROCEDURE — 97110 THERAPEUTIC EXERCISES: CPT

## 2022-05-04 NOTE — PROGRESS NOTES
Diagnosis: difficulty walking, decreased tolerance to activity       Visit (# authorized):  13 per [facility] Medicare)                        Referring Physician: Brain Mullins    Precautions: at risk of falls     Medication changes since last visit?:  No     Subjective: no new information to report. Objective:    Date  3/24 3/31 4/5  4/7  4/14 4/27 5/4     Visit Number  5 6 7 8 (PN) 9 10 11     NMR  x x x x x x     Ther EX x x x x x x x     Ther Act x x x  x x x     Gait Training            CRM             Manual              Additional Treatment Information:    Therapeutic Exercise (12 mins):   Nu-step BLE only x10 mins level 5- goal direction based on SPM+ seated rest     Therapeutic activity (10 mins):   Sit<>stand: performed multiple bouts throughout session without VC's -increased time to complete   Trial of sit<>stand with foam cushion on w/c seat 2 bouts- pt's  educated on recommendation for foam w/c cushion for home use. NMR (20 mins):   Multiple bouts of ambulation including over compliant, uneven surfaces, over and around obstacles- SBA   Ambulation weaving around cones (4)- 4 bouts- SBA  Fwd<>backwards walking on command with stop/starts on command- multiple bouts SBA       Assessment:   Pt requiring increased time for processing and motor planning but demo's good postural stability throughout despite progressed activities. Improved sit to stand with use of foam pad on seat with less retropulsion.      Plan:  in future sessions:continue shuttle,continue upper trunk rotations, continue standing tasks unsupported, continue dual tasking     Charges: 1 TE, 1 TA, 1 NMR       Total Timed Treatment: 42 min  Total Treatment Time: 42 min

## 2022-05-06 ENCOUNTER — OFFICE VISIT (OUTPATIENT)
Dept: OCCUPATIONAL MEDICINE | Facility: HOSPITAL | Age: 82
End: 2022-05-06
Attending: INTERNAL MEDICINE
Payer: MEDICARE

## 2022-05-06 PROCEDURE — 97110 THERAPEUTIC EXERCISES: CPT | Performed by: OCCUPATIONAL THERAPIST

## 2022-05-06 PROCEDURE — 97140 MANUAL THERAPY 1/> REGIONS: CPT | Performed by: OCCUPATIONAL THERAPIST

## 2022-05-06 NOTE — PROGRESS NOTES
Dx:    Tremors of nervous system (R25.1)  Worsened handwriting (R27.8)  Hand weakness (R29.89     Authorized # of Visits: 6        Next MD visit: none scheduled  Fall Risk: standard         Precautions: fall risk , difficulty vocalizing     Medication Changes since last visit?: No  Subjective: Patient reports her left shoulder is hurting today. Objective: Treatment began with moist heat to right shoulder, shoulder PROM follow by scapula and shoulder A/PROM, two point pinch, light intrinsic hand strengthening, functional reaching activity. . See flow sheet for details. Date 05/06/22                 Visit # 9                                     Evaluation                 Manual                   PROM right shoulder 5 min in sitting                  STM bilateral hands and right bicep  5 min                                     Ther ex                   scapula pulls with red T band  x10                 Bicep PRE with 2# X10, bilateral                  digit abd/add                    left digit abd/add with rubber band x20                  velcro checkers two point pinch left hnad  x40                   three point pinch to use clothespins to replace velcro checkers in staning  right hand x 40                  intrinsic hand exercise with rubber band  x10 bilaterally                 HEP instruction                                        Therapeutic Activity                                       Neuromuscular Re-education                                                             Modalities                    moist heat  3 min                                             Assessment: Patient arrived in wheelchair accompanied by her . Patient at supervision level to transfer to regular chair from [place] [address]. Having difficulty with understanding patient, very soft spoken. Patient became frustrated and agitated with having to repeat her comments.  Patient became fatigued with shoulder AROM and strengthening exercises, reporting pain in right bicep. Goals:   Pt will be at supervision level and compliant with comprehensive HEP to maintain progress achieved in OT. ..(ongoing)  Patient sensation in right hand to be assessed with Shari Heir monofilaments. .. Gallagher Riding (Achieved)  Patient will demonstrate increase in right  strength to at least 20 lbs for ease in opening containers. ...(made progress toward)  Patient will demonstrate improved hand dexterity with decrease in 9 hole peg test time by 10 secs for ease in writing. ..(ongoing)  Patient will demonstrate increase in right shoulder flexion to at least 90 degrees, abduction to 70 degrees for ease in feeding self. Gallagher Riding .(Achieved for abduction)      Plan: Continue to work towards goals.     Charges: MT, TE2 Total Timed Treatment: 43 min  Total Treatment Time: 45 min

## 2022-05-11 ENCOUNTER — TELEPHONE (OUTPATIENT)
Dept: PHYSICAL THERAPY | Facility: HOSPITAL | Age: 82
End: 2022-05-11

## 2022-05-11 ENCOUNTER — APPOINTMENT (OUTPATIENT)
Dept: PHYSICAL THERAPY | Facility: HOSPITAL | Age: 82
End: 2022-05-11
Attending: INTERNAL MEDICINE
Payer: MEDICARE

## 2022-05-12 ENCOUNTER — APPOINTMENT (OUTPATIENT)
Dept: OCCUPATIONAL MEDICINE | Facility: HOSPITAL | Age: 82
End: 2022-05-12
Attending: INTERNAL MEDICINE
Payer: MEDICARE

## 2022-05-18 ENCOUNTER — TELEPHONE (OUTPATIENT)
Dept: PHYSICAL THERAPY | Facility: HOSPITAL | Age: 82
End: 2022-05-18

## 2022-05-19 ENCOUNTER — APPOINTMENT (OUTPATIENT)
Dept: PHYSICAL THERAPY | Facility: HOSPITAL | Age: 82
End: 2022-05-19
Attending: INTERNAL MEDICINE
Payer: MEDICARE

## 2022-05-20 ENCOUNTER — APPOINTMENT (OUTPATIENT)
Dept: OCCUPATIONAL MEDICINE | Facility: HOSPITAL | Age: 82
End: 2022-05-20
Attending: INTERNAL MEDICINE
Payer: MEDICARE

## 2022-05-25 ENCOUNTER — APPOINTMENT (OUTPATIENT)
Dept: OCCUPATIONAL MEDICINE | Facility: HOSPITAL | Age: 82
End: 2022-05-25
Attending: INTERNAL MEDICINE
Payer: MEDICARE

## 2022-05-26 ENCOUNTER — APPOINTMENT (OUTPATIENT)
Dept: PHYSICAL THERAPY | Facility: HOSPITAL | Age: 82
End: 2022-05-26
Attending: INTERNAL MEDICINE
Payer: MEDICARE

## 2022-06-01 ENCOUNTER — OFFICE VISIT (OUTPATIENT)
Dept: PHYSICAL THERAPY | Facility: HOSPITAL | Age: 82
End: 2022-06-01
Attending: INTERNAL MEDICINE
Payer: MEDICARE

## 2022-06-01 PROCEDURE — 97530 THERAPEUTIC ACTIVITIES: CPT

## 2022-06-01 PROCEDURE — 97110 THERAPEUTIC EXERCISES: CPT

## 2022-06-01 NOTE — PATIENT INSTRUCTIONS
Do these exercises daily. 1. Sit up tall in chair. Lift leg up and across as if you are going to tie your shoe. Lift ad high as you are able then return foot to floor. Do this 10 times with each leg. 2. Sit up tall in chair. Tie green band around thighs, above knee. Separate knees then bring them back together. Do this 15 times. 3. Stand up tall at countertop top and hold on with both hands. Toes pointed forward. Lift heels up then lower them back to the ground. Do this 15 times. 4. Stand up tall at countertop and hold on with one hand. With the leg closer to the countertop, march your knee up then lower foot back to ground. Do this 10 times. Then turn around and hold on with the other hand, marching with the other leg 10 times. 5. Stand up tall at countertop top and hold on with both hands. Hips and toes pointed forward. Side step along countertop in both directions until you have done 10-15 each way. Make sure to try and take wide steps out to the side.

## 2022-06-01 NOTE — PROGRESS NOTES
Diagnosis: difficulty walking, decreased tolerance to activity       Visit (# authorized):  13 per [facility] Medicare)                        Referring Physician: Mar Ortega    Precautions: at risk of falls     Medication changes since last visit?:  No     Subjective: Missed last several session 2/2 being COVID+. Per family pt did well despite illness. Objective:    Date  3/24 3/31 4/5  4/7  4/14 4/27 5/4 6/1    Visit Number  5 6 7 8 (PN) 9 10 11 12    NMR  x x x x x x     Ther EX x x x x x x x x    Ther Act x x x  x x x x    Gait Training            CRM             Manual              Additional Treatment Information:    Therapeutic Exercise (28 mins):   Nu-step BLE only x10 mins level 5- goal direction based on SPM+ seated rest   Seated LE diagonals x10 B   Seated hip ABD green TB x15  Standing B heel raise x15  Standing hip flexion 1 UE support x10 B   Standing side stepping along countertop, BUE support x3 lengths ea direction    Therapeutic activity (10 mins):   Sit<>stand: performed multiple bouts throughout session without VC's -increased time to complete -with and without foam cushion  Pt/family education: re: seating recommendations for home as well as sequencing for car transfer     Patient Education: Review and update of HEP. Additional education as above. Assessment:   Pt requiring increased time for processing and motor planning but demo's good carryover from previous sessions. Tolerated session well without adverse response.      Plan: reassess, plan for DC    Charges: 2 TE, 1 TA       Total Timed Treatment: 38 min  Total Treatment Time: 38 min

## 2022-06-02 ENCOUNTER — APPOINTMENT (OUTPATIENT)
Dept: OCCUPATIONAL MEDICINE | Facility: HOSPITAL | Age: 82
End: 2022-06-02
Attending: INTERNAL MEDICINE
Payer: MEDICARE

## 2022-06-02 ENCOUNTER — TELEPHONE (OUTPATIENT)
Dept: PHYSICAL THERAPY | Facility: HOSPITAL | Age: 82
End: 2022-06-02

## 2022-06-08 ENCOUNTER — TELEPHONE (OUTPATIENT)
Dept: PHYSICAL THERAPY | Facility: HOSPITAL | Age: 82
End: 2022-06-08

## 2022-06-09 ENCOUNTER — APPOINTMENT (OUTPATIENT)
Dept: OCCUPATIONAL MEDICINE | Facility: HOSPITAL | Age: 82
End: 2022-06-09
Attending: INTERNAL MEDICINE
Payer: MEDICARE

## 2022-06-16 ENCOUNTER — OFFICE VISIT (OUTPATIENT)
Dept: PHYSICAL THERAPY | Facility: HOSPITAL | Age: 82
End: 2022-06-16
Attending: INTERNAL MEDICINE
Payer: MEDICARE

## 2022-06-16 PROCEDURE — 97110 THERAPEUTIC EXERCISES: CPT

## 2022-06-16 PROCEDURE — 97530 THERAPEUTIC ACTIVITIES: CPT

## 2022-06-16 NOTE — PROGRESS NOTES
Progress Summary    Referring Physician: Dickson Crowell    Diagnosis: difficulty walking, decreased tolerance to activity         Pt has attended 13 visits in Physical Therapy. Subjective: Pt reports everything is going well at home, though pt admits she is not doing her provided HEP. Would like an additional copy of HEP. Per son: he is concerned about when she goes to the bathroom at night and has to bend over to pull up pants as he feels she is at risk of falling. He also notes she continues to pull on the table while arising to stand. Assessment: Pt continues to demonstrate good postural stability without AD during ambulation though functional gait speed slower today compared to previous sessions as evidenced by TUG and 2 minute walk test. She continues to require VC's for sit<>stand with increased time for processing. She would benefit from a grabber to assist with picking up items from floor. Pt and son verbalize understanding of material taught today. She is to be discharged at this time, may return in future with new order should decline in function occur. Objective:   *values from last testing documented in parenthesis below    Timed Up and Go (AD, time): 33 seconds  (30.5 seconds) no AD supervision               2 minute walk test: no  ft (222 ft)     Standing unsupported bending over to  items of different heights: able to  cone but unable to  disc from floor though maintains balance independently with both tasks     VC's to \"Scoot forward\" in chair prior to standing up- sit<>stand multiple bouts     Pt and son educated on providing short commands with increased time to allow patient to respond and act. Rationale for cushion on seat explained. Advised use of grabber to assist with picking up pants from floor when toileting to maximize level of safety. Questions re: [facility] services answered. Progress in therapy explained. Goals:   Goals to be met within POC or 90 days:  1.  Pt to perform HEP with supervision. Ongoing   2. Pt will improve functional gait speed in order to improve TUG by at least 6 seconds to reflect improved safety with household ambulation Goal met 4/7  3. Pt will improve functional BLE strength in order to perform sit<>stand with BUE independently on first attempt. Goal met 4/7  4. Pt will perform Romberg stance for 30 seconds EC without LOB in order to reflect improved safety with mobility in darker environments such as when she gets up to go to the bathroom at night. IN progress   New goal (4/7): Pt will improve TUG by an additional 10 seconds to reflect reduced risk of falls in home. New goal (4/7): Pt will perform sit<>stand without VC's for sequence in order to improve level of independence in home. Charges: 1 TA, 1 TE     Total timed treatment: 23 mins  Total treatment time: 23 mins       Plan: DC PT       Patient/Family/Caregiver was advised of these findings, precautions, and treatment options and has agreed to actively participate in planning and for this course of care. Thank you for your referral. If you have any questions, please contact me at Dept: [phone].     Sincerely,    Hortensia Harrison PT, NCS    Electronically signed by therapist: Hortensia Harrison PT

## 2022-06-20 ENCOUNTER — HOSPITAL ENCOUNTER (OUTPATIENT)
Dept: CT IMAGING | Facility: HOSPITAL | Age: 82
Discharge: HOME | End: 2022-06-20
Attending: SURGERY
Payer: MEDICARE

## 2022-06-20 DIAGNOSIS — K43.9 VENTRAL HERNIA WITHOUT OBSTRUCTION OR GANGRENE: ICD-10-CM

## 2022-06-20 PROCEDURE — 74176 CT ABD & PELVIS W/O CONTRAST: CPT | Performed by: SURGERY

## 2022-06-27 ENCOUNTER — OFFICE VISIT (OUTPATIENT)
Dept: OCCUPATIONAL MEDICINE | Facility: HOSPITAL | Age: 82
End: 2022-06-27
Attending: INTERNAL MEDICINE
Payer: MEDICARE

## 2022-06-27 PROCEDURE — 97110 THERAPEUTIC EXERCISES: CPT | Performed by: OCCUPATIONAL THERAPIST

## 2022-06-27 PROCEDURE — 97140 MANUAL THERAPY 1/> REGIONS: CPT | Performed by: OCCUPATIONAL THERAPIST

## 2022-06-27 NOTE — PROGRESS NOTES
Dx:    Tremors of nervous system (R25.1)  Worsened handwriting (R27.8)  Hand weakness (R29.89     Authorized # of Visits: 6        Next MD visit: none scheduled  Fall Risk: standard         Precautions: fall risk , difficulty vocalizing     Medication Changes since last visit?: No  Subjective: Patient reports right shoulder pain persists especially when reaching. Objective: Treatment began with moist heat to right shoulder, follow by reevaluation, shoulder PROM follow by scapula and shoulder A/PROM, two point pinch, light intrinsic hand strengthening, functional reaching activity. . See flow sheet for details.     Date 05/06/22 06/27/22               Visit # 9   10                                  Evaluation                 Manual                   PROM right shoulder 5 min in sitting  5 min in sitting                STM bilateral hands and right bicep  5 min  5 min in sitting                    reevaluation               Ther ex                   scapula pulls with red T band  x10  x10, 2 sets               Bicep PRE with 2# X10, bilateral  shoulder AAROMcane exercise, flex/ext, circles, abduction/adduciton x 10                digit abd/add    towel walking bilateral x5                left digit abd/add with rubber band x20  composite flexion grasping potato masher, pushing into yellow putty x 10                velcro checkers two point pinch left hnad  x40   red putty, , twist ,pinch 5 min                three point pinch to use clothespins to replace velcro checkers in staning  right hand x 40                  intrinsic hand exercise with rubber band  x10 bilaterally  x10 bilaterally               HEP instruction                                        Therapeutic Activity                                       Neuromuscular Re-education                                                             Modalities                    moist heat  3 min  3 min                                         Assessment: Patient arrived in wheelchair accompanied by her daughter in law. Patient at supervision level to transfer to regular chair from [place] [address]. Patient alert, motivated, voice stronger, able to understand her responses today. Seen for OT reevaluation and treatment, patient had one more treatment session schedule however family decided to cancel. Goals:  See below for objective measurements  Plan:  See below for objective measurements    Charges: MT, TE2 Total Timed Treatment: 40 min  Total Treatment Time: 42 min              OT Discharge Summary    Pt has attended 10, cancelled 0, and no shown 0 visits in Occupational Therapy. Subjective: \"My right shoulder still hurts. \" pain in right shoulder 7/10    Assessment: Patient has made good progress in improving strength and AROM in right UE. Since starting OT she improved right  strength by 6 lbs and right shoulder AROM in flexion by 10 degrees and abduction by 20 degrees. No significant change in hand dexterity test, 9 Hole peg test. Patient hsa improved to Supervision/Minimal assist level with self care and transfers. Recommend discharge from OT, patient and family in agreement with plan. Objective: See below for objective measurements    Hand dexterity:  9 hole peg test    Right: 45 secs, Left 54 secs  AROM right shoulder: flexion: 90 degrees, Abduction: 80 degres  Right  strength: 21,20,19      AV lbs        Left  strength: 7,13,10   AVG: 10 lbs      Goals:   Pt will be at supervision level and compliant with comprehensive HEP to maintain progress achieved in OT. Ana M Tim .(Achieved)  Patient sensation in right hand to be assessed with Catrina Hussar monofilaments. .. Ana M Tim (Achieved)  Patient will demonstrate increase in right  strength to at least 20 lbs for ease in opening containers. .. Trula Glenroy (Achieved)  Patient will demonstrate improved hand dexterity with decrease in 9 hole peg test time by 10 secs for ease in writing. Ana M Tim .(Not Achieved)  Patient will demonstrate increase in right shoulder flexion to at least 90 degrees, abduction to 70 degrees for ease in feeding self. Gallagher Riding .(Achieved)      Rehab Potential: fair    Plan: Patient has made good progress since starting OT. Recommend discharge from OT, patient and family in agreement with plan. Patient was advised of these findings, precautions, and treatment options and has agreed to actively participate in planning and for this course of care. Thank you for your referral. If you have any questions, please contact me at Dept: [phone].     Sincerely,  Electronically signed by therapist: JOVANNA Melara/ESEQUIEL, CHT

## 2022-07-07 ENCOUNTER — APPOINTMENT (OUTPATIENT)
Dept: OCCUPATIONAL MEDICINE | Facility: HOSPITAL | Age: 82
End: 2022-07-07
Attending: INTERNAL MEDICINE
Payer: MEDICARE

## 2022-12-15 ENCOUNTER — TELEPHONE (OUTPATIENT)
Dept: NEUROLOGY | Facility: CLINIC | Age: 82
End: 2022-12-15

## 2022-12-15 NOTE — TELEPHONE ENCOUNTER
Received report from the Movement Disorder clinic. Report has been placed on Dr. Jo Navas desk to review. Report has also been sent for scanning. Reviewed and electronically signed by:  [address] [facility], [address] [place] [address], [facility]

## 2023-01-16 ENCOUNTER — HOSPITAL ENCOUNTER (OUTPATIENT)
Dept: GENERAL RADIOLOGY | Facility: HOSPITAL | Age: 83
Discharge: HOME | End: 2023-01-16
Attending: INTERNAL MEDICINE
Payer: MEDICARE

## 2023-01-16 ENCOUNTER — LAB ENCOUNTER (OUTPATIENT)
Dept: LAB | Facility: HOSPITAL | Age: 83
End: 2023-01-16
Attending: INTERNAL MEDICINE
Payer: MEDICARE

## 2023-01-16 DIAGNOSIS — M25.551 RIGHT HIP PAIN: ICD-10-CM

## 2023-01-16 DIAGNOSIS — M54.50 LOW BACK PAIN: ICD-10-CM
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
## 2023-01-16 DIAGNOSIS — Z11.1 SCREENING-PULMONARY TB: Primary | ICD-10-CM

## 2023-01-16 PROCEDURE — 72110 X-RAY EXAM L-2 SPINE 4/>VWS: CPT | Performed by: INTERNAL MEDICINE

## 2023-01-16 PROCEDURE — 72100 X-RAY EXAM L-S SPINE 2/3 VWS: CPT | Performed by: INTERNAL MEDICINE

## 2023-01-16 PROCEDURE — 36415 COLL VENOUS BLD VENIPUNCTURE: CPT

## 2023-01-16 PROCEDURE — 86480 TB TEST CELL IMMUN MEASURE: CPT

## 2023-01-18 LAB
M TB IFN-G CD4+ T-CELLS BLD-ACNC: 0.05 IU/ML
M TB TUBERC IFN-G BLD QL: NEGATIVE
M TB TUBERC IGNF/MITOGEN IGNF CONTROL: >10 IU/ML
QFT TB1 AG MINUS NIL: 0.02 IU/ML
QFT TB2 AG MINUS NIL: 0.01 IU/ML

## 2023-01-27 ENCOUNTER — OFFICE VISIT (OUTPATIENT)
Dept: UROLOGY | Facility: HOSPITAL | Age: 83
End: 2023-01-27
Attending: OBSTETRICS & GYNECOLOGY
Payer: MEDICARE

## 2023-01-27 VITALS
BODY MASS INDEX: 27.54 KG/M2 | HEIGHT: 55 IN | WEIGHT: 119 LBS | DIASTOLIC BLOOD PRESSURE: 82 MMHG | SYSTOLIC BLOOD PRESSURE: 140 MMHG

## 2023-01-27 DIAGNOSIS — R35.1 NOCTURIA: Primary | ICD-10-CM

## 2023-01-27 DIAGNOSIS — R30.0 DYSURIA: ICD-10-CM

## 2023-01-27 DIAGNOSIS — N39.41 URGE INCONTINENCE: ICD-10-CM

## 2023-01-27 DIAGNOSIS — N81.84 PELVIC MUSCLE WASTING: ICD-10-CM

## 2023-01-27 DIAGNOSIS — N95.2 POSTMENOPAUSAL ATROPHIC VAGINITIS: ICD-10-CM

## 2023-01-27 LAB
BLOOD URINE: NEGATIVE
CONTROL RUN WITHIN 24 HOURS?: YES
LEUKOCYTE ESTERASE URINE: NEGATIVE
NITRITE URINE: NEGATIVE

## 2023-01-27 PROCEDURE — 99202 OFFICE O/P NEW SF 15 MIN: CPT

## 2023-01-27 PROCEDURE — 87086 URINE CULTURE/COLONY COUNT: CPT | Performed by: OBSTETRICS & GYNECOLOGY

## 2023-01-27 PROCEDURE — 51701 INSERT BLADDER CATHETER: CPT

## 2023-01-27 PROCEDURE — 81002 URINALYSIS NONAUTO W/O SCOPE: CPT | Performed by: OBSTETRICS & GYNECOLOGY

## 2023-01-30 ENCOUNTER — TELEPHONE (OUTPATIENT)
Dept: UROLOGY | Facility: HOSPITAL | Age: 83
End: 2023-01-30

## 2023-01-30 NOTE — TELEPHONE ENCOUNTER
TC to patient  Vania Servin informing him that Avis's urine culture came back with no growth. No treatment is necessary . Vania Servin verbalized understanding. Encourage him to call with any questions or concerns.

## 2023-02-02 ENCOUNTER — OFFICE VISIT (OUTPATIENT)
Dept: UROLOGY | Facility: HOSPITAL | Age: 83
End: 2023-02-02
Payer: MEDICARE

## 2023-02-02 VITALS — WEIGHT: 119 LBS | BODY MASS INDEX: 27.54 KG/M2 | HEIGHT: 55 IN | RESPIRATION RATE: 18 BRPM

## 2023-02-02 DIAGNOSIS — N39.41 URGE INCONTINENCE: Primary | ICD-10-CM

## 2023-02-02 LAB
BLOOD URINE: NEGATIVE
CONTROL RUN WITHIN 24 HOURS?: YES
NITRITE URINE: NEGATIVE

## 2023-02-02 PROCEDURE — 51741 ELECTRO-UROFLOWMETRY FIRST: CPT

## 2023-02-02 PROCEDURE — 81002 URINALYSIS NONAUTO W/O SCOPE: CPT

## 2023-02-02 PROCEDURE — 51729 CYSTOMETROGRAM W/VP&UP: CPT

## 2023-02-02 PROCEDURE — 51797 INTRAABDOMINAL PRESSURE TEST: CPT

## 2023-02-02 PROCEDURE — 51784 ANAL/URINARY MUSCLE STUDY: CPT

## 2023-02-02 NOTE — PROCEDURES
Patient here with her daughter Elva Araujo for urodynamic testing. Patient in wheelchair with limited mobility. Procedure explained and confirmed by patient. See evaluation form for results. Both verbal and written discharge instructions were given. Patient tolerated procedure well and will follow up with Dr. Luis Ferrara on 2023. URODYNAMIC EVALUATION    PATIENT HISTORY:    Prolapse:  No  GENIE:  Yes (reports occasionally)  UUI:  Yes (reports more bothersome)  Nocturia:  more than 4 (reports up every 1 to 2 hours at night)  Frequency:  every 1-2 hours  Incomplete Emptying:  Yes (Reports sense of incomplete emptying. Reports shifting to void at times)  Constipation:  Yes (Reports improvement with using Homa lax. Bowel regimen reviewed and handouts provided). Last void prior to UDS testin hour  Current urge to void? Moderate  OAB meds stopped prior to test?  NA  Other symptoms? Surgery? [x]  No  []  Yes, specify date:  Interested in all options. PATIENT DIAGNOSIS:  Urge Incontinence N39.41 and Incomplete Bladder Emptying R39.14    MEDICATION: No outpatient medications have been marked as taking for the 23 encounter (Office Visit) with [facility] PROCEDURE. ALLERGIES:  Shellfish Allergy, Biaxin [Clarithromycin], Aspirin, Contrast  [Radiology Contrast Iodinated Dyes], Demerol Hcl [Meperidine], Dust, Iodine (Topical), Other, Penicillins, Polysporin [Bacitracin-Polymyxin B], Shellfish-Derived Products, Tetracycline, and Trichophyton      EXAM:  Urinalysis Dip: Castile soap used to cleanse urethra. Today's Results   Component Date Value    control run 2023 Yes     Blood Urine 2023 Negative     Nitrite Urine 2023 Negative     Leukocyte esterase urine 2023 Small       Uroves[place]   [x]   Not Mobile  []  Fixed    Perineal Sensation:  [x]  Normal  []  Abnormal    Additional Notes:    PROLAPSE (past introitus):  []  Yes  [x]  No  Prolapse reduced for testing?   []  Yes [x]  No  []  Pessary  []  Speculum  []  Proctoswab  []  Vag Packing    Additional Notes:    UROFLOWMETRY:  Voided Volume:              Na            mL  Maximum Flow Rate:                 Na             mL/sec  Average flow rate:              Na              mL/sec  Post-void Residual:             600           mL  Pattern:  []  Normal  []  Poor flow     []  Intermittent  [x]  Other  Void:   []  Typical  []  Atypical    Additional Notes: Uroflow obtained unreduced. Patient given time and privacy to void. Patient unable to void.  mLs    CYSTOMETRY:  Urethral Catheter:  Fr 7 / tdoc  Abd Catheter:     Fr 7 / tdoc   Infusion:  Water Rate 30 mL/min  Temp:  Room  Position:  [x]  Sit  []  Stand  []  Supine  First sensation:   32 mL  First desire to void:                186         mL  Strong desire to void:  326 mL  Maximum cystometric capacity:   365 mL  Detrusor Activity:  [x]  Unstable   []  Stable  Urge leakage? []  Yes [x]  No  Volume at 1st unhibited detrusor cont:   141 mL  Detrusor instability provoked by:    [x]  Spontaneous  []  Coughing  [x]  Filling  []  Valsalva  []  Other    Additional Notes:      URETHRAL FUNCTION:  Valsava (vesical) Leak Point Pressures:    Volume Leak Point Pressure Leak? Cough Valsalva      100mL 24 20    cm H2O []  Yes [x]  No   150mL 33 50    cm H2O []  Yes [x]  No   200mL 34 32    cm H2O []  Yes [x]  No   300mL 56 54  cm H2O []  Yes [x]  No   350mL 64 65 cm H2O []  Yes [x]  No     Patient given verbal commands  to cough and valsalva. Difficult for patient to demonstrate cough or valsalva     Genuine Stress Incontinence demonstrated? []  Yes  [x]  No    Resting Urethral Pressure Profile:     Functional Urethral Length:       1.6   cm            1.0     cm     Maximum UCP:       80    cm         91    cm       PRESSURE/FLOW STUDY:  Voided volume:   50   mL  Maximum flow rate:     Na mL/sec  Pressure Detrusor (at maximum flow):       Na      cm H2O  Post void residual: 500          mL  Voiding mechanism:  []  Abnormal  []  Normal  []  Strain to void   []  Weak detrusor  Void:   [x]  Typical   []  Atypical    Additional Notes: UDS testing challenging. Limited mobility. Flow study obtained unreduced. Patient given time and privacy unable to void. Patient up to bedside commode with assistance. Patient able to void 50 mLs.  mLs.    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

## 2023-02-06 ENCOUNTER — TELEPHONE (OUTPATIENT)
Dept: UROLOGY | Facility: HOSPITAL | Age: 83
End: 2023-02-06

## 2023-02-06 NOTE — TELEPHONE ENCOUNTER
TC from pt's daughter-in-law Elsadavid Andrade (a nurse) regarding next steps after UDS done last week. Cindi Fajardoy states she wasn't able to go to the pt's UDS and wasn't sure what to do next. Informed Cindi Andrade she has a f/u appt in-office 2/17/23 with Dr. Darek Milligan to discuss the results of the test in detail and the plan going forward. Cindi Andrade stated pt is moving to an [address] [place] [address] in [address]) soon. Encouraged Cindi Andrade to call [facility] prior to UDS f/u to see what level of care and services the facility provides in the event the patient needs a catheter, so as to help guide their decisions during UDS appt. All questions answered. Encouraged to call with any further questions or concerns. Amanda verbalized understanding.

## 2023-02-17 ENCOUNTER — OFFICE VISIT (OUTPATIENT)
Dept: UROLOGY | Facility: HOSPITAL | Age: 83
End: 2023-02-17
Attending: OBSTETRICS & GYNECOLOGY
Payer: MEDICARE

## 2023-02-17 VITALS — RESPIRATION RATE: 18 BRPM | WEIGHT: 119 LBS | HEIGHT: 55 IN | BODY MASS INDEX: 27.54 KG/M2

## 2023-02-17 DIAGNOSIS — N39.41 URGE INCONTINENCE: ICD-10-CM

## 2023-02-17 DIAGNOSIS — R33.9 INCOMPLETE BLADDER EMPTYING: Primary | ICD-10-CM

## 2023-02-17 DIAGNOSIS — R35.1 NOCTURIA: ICD-10-CM

## 2023-02-17 DIAGNOSIS — N95.2 POSTMENOPAUSAL ATROPHIC VAGINITIS: ICD-10-CM

## 2023-02-17 DIAGNOSIS — N32.81 DETRUSOR INSTABILITY: ICD-10-CM

## 2023-02-17 DIAGNOSIS — N81.84 PELVIC MUSCLE WASTING: ICD-10-CM

## 2023-02-17 PROCEDURE — 99212 OFFICE O/P EST SF 10 MIN: CPT

## 2023-02-17 NOTE — PROGRESS NOTES
[phone]. Spoke with Anabella Banks the Director of Nursing. Chuck Danielson instructed our office to fax progress notes, demographic sheet, insurance information, and     medication list to [phone] for her review. The above faxed to [phone] with confirmation received. Upon review of the information provided will notify our office if the patient can be seen by Thelma Ruggiero. Will follow.

## 2023-02-20 ENCOUNTER — TELEPHONE (OUTPATIENT)
Dept: UROLOGY | Facility: HOSPITAL | Age: 83
End: 2023-02-20

## 2023-02-20 NOTE — TELEPHONE ENCOUNTER
[facility] from [facility] Rafa Baum requesting what size is the current catheter and the size of the balloon. Informed Rafa Baum RN that we placed a 12 F catheter with a 5 ml balloon. Rafa Baum verbalized understanding. Encouraged her to call with any questions or concerns.

## 2023-02-20 NOTE — TELEPHONE ENCOUNTER
Outgoing telephone call to the patient. Patient's  Italia Rodriguez answered. Informed the patient's  Italia Rodriguez that [facility] home health is set up. (ph.[phone])    Warren Guevara the Director of nursing will be reaching out to the family early this week. The patient's  informed our office that their son Brant Watts lives locally and can be reached     at [phone] if needed. Encouraged to call with questions or concerns. Office number provided.

## 2023-02-22 ENCOUNTER — TELEPHONE (OUTPATIENT)
Dept: UROLOGY | Facility: HOSPITAL | Age: 83
End: 2023-02-22

## 2023-02-22 NOTE — TELEPHONE ENCOUNTER
Incoming telephone call received from the patients daughter. Yessy Villavicencio.     Patients daughter calling to confirm she received message from our office. Confirmation provided that her mother will be receiving home health care through [facility] home health. [facility] phone number is [phone]. The Director of Nursing at [facility] is Juwan Osorio. Patients daughter verbalized understanding. Encouraged to call with questions or concerns.

## 2023-03-23 ENCOUNTER — TELEPHONE (OUTPATIENT)
Dept: UROLOGY | Facility: HOSPITAL | Age: 83
End: 2023-03-23

## 2023-03-23 NOTE — TELEPHONE ENCOUNTER
Incoming fax received from [address]. Forms reviewed and faxed back to [facility]. Forms not signed. The forms need to be reviewed by [facility] updated and sent back for a signature from the physician.

## 2023-03-27 ENCOUNTER — TELEPHONE (OUTPATIENT)
Dept: UROLOGY | Facility: HOSPITAL | Age: 83
End: 2023-03-27

## 2023-03-27 NOTE — TELEPHONE ENCOUNTER
Incoming fax received from [facility]. Updated forms reviewed and signed. Forms faxed to [phone] with fax confirmation received. Forms faxed into patients chart.

## 2023-04-04 ENCOUNTER — LAB ENCOUNTER (OUTPATIENT)
Dept: LAB | Facility: HOSPITAL | Age: 83
End: 2023-04-04
Attending: PHYSICIAN ASSISTANT
Payer: MEDICARE

## 2023-04-04 ENCOUNTER — TELEPHONE (OUTPATIENT)
Dept: UROLOGY | Facility: HOSPITAL | Age: 83
End: 2023-04-04

## 2023-04-04 DIAGNOSIS — R35.1 NOCTURIA: ICD-10-CM

## 2023-04-04 DIAGNOSIS — R41.0 CONFUSION: ICD-10-CM

## 2023-04-04 DIAGNOSIS — R30.0 DYSURIA: Primary | ICD-10-CM

## 2023-04-04 PROCEDURE — 87088 URINE BACTERIA CULTURE: CPT

## 2023-04-04 PROCEDURE — 87186 SC STD MICRODIL/AGAR DIL: CPT

## 2023-04-04 PROCEDURE — 87086 URINE CULTURE/COLONY COUNT: CPT

## 2023-04-04 RX ORDER — NITROFURANTOIN 25; 75 MG/1; MG/1
100 CAPSULE ORAL 2 TIMES DAILY
Qty: 20 CAPSULE | Refills: 0 | Status: SHIPPED | OUTPATIENT
Start: 2023-04-04 | End: 2023-04-14

## 2023-04-04 NOTE — TELEPHONE ENCOUNTER
TC to pt's  Dandre Beaverselton to f/u UTI complaints. Allergies and previous cultures reviewed    Discussed with HANK Dixon Macrobid 100 mg PO bid x 10 days. Anne catheter to be changed and urine specimen obtained from new catheter. Dandre Reyna and MAGGI Michaud will reach out to [facility] RN to request Anne catheter change and urine collection. Instructed specimen to be collected prior to starting antibiotics. Family will drop off specimen at [facility] lab. Urine culture ordered, will follow    Empiric antibiotics sent to patient's preferred pharmacy. Encouraged PO hydration, AZO prn for pain     Discussed avoidance of bladder irritants such as alcohol, caffeine, carbonation    All questions answered    Encouraged to call if symptoms worsen or fail to improve     Office number provided Pilar Douglas understands and agrees to plan.

## 2023-04-04 NOTE — TELEPHONE ENCOUNTER
Patient daughter called reporting that her mother Robin Vicente has been very confused these past two days and was concerned that she might has a UTI.

## 2023-04-06 ENCOUNTER — TELEPHONE (OUTPATIENT)
Dept: UROLOGY | Facility: HOSPITAL | Age: 83
End: 2023-04-06

## 2023-04-06 NOTE — TELEPHONE ENCOUNTER
Telephone call to the  patient with test results. Spoke with the patients . Informed the patients  that the urine culture obtained on 04/04/2023 was positive for     >10,000-50,000 E coli. To continue taking Macrobid bid po x 10 days as ordered. Patient  states he hasn't picked up the medication yet. Informed the  to  the medication and take as directed. Patients  states he will be picking up the medication today. All questions answered     Follow up as scheduled or sooner.       Patients  understands and agrees to plan

## 2023-04-07 ENCOUNTER — TELEPHONE (OUTPATIENT)
Dept: UROLOGY | Facility: CLINIC | Age: 83
End: 2023-04-07

## 2023-04-07 ENCOUNTER — TELEPHONE (OUTPATIENT)
Dept: UROLOGY | Facility: HOSPITAL | Age: 83
End: 2023-04-07

## 2023-04-07 DIAGNOSIS — N30.00 ACUTE CYSTITIS WITHOUT HEMATURIA: Primary | ICD-10-CM

## 2023-04-07 RX ORDER — SULFAMETHOXAZOLE AND TRIMETHOPRIM 800; 160 MG/1; MG/1
1 TABLET ORAL 2 TIMES DAILY
Qty: 20 TABLET | Refills: 0 | Status: SHIPPED | OUTPATIENT
Start: 2023-04-07 | End: 2023-04-17

## 2023-04-07 NOTE — TELEPHONE ENCOUNTER
TC to daughter Susan Abreu who is in [place]  Did not receive my earlier voicemail due to using wifi calling abroad  Reiterated conversation I had with patient's spouse Vandana Benedict earlier today  Stop Macrobid due to allergic reaction and start Bactrim DS for 10 days  Take OTC benadryl as needed   She verbalized understanding and agrees to tx plan  She had no further questions

## 2023-04-07 NOTE — TELEPHONE ENCOUNTER
TC to patient's  Lore David  Patient took one tablet of Macrobid and experienced tongue swelling and redness  Patient feeling better now, swelling improved  Denies any shortness of breath or difficulty breathing  Denies any rash  Will order patient Bactrim DS BID for 10 days for +UTI  Instructed Lore David to  OTC benadryl and take as directed  He verbalized understanding and had no further questions  Attempted to call daughter Arcadio Burnett, went to voicemail, left message to call office back

## 2023-04-21 ENCOUNTER — TELEPHONE (OUTPATIENT)
Dept: UROLOGY | Facility: HOSPITAL | Age: 83
End: 2023-04-21

## 2023-04-21 ENCOUNTER — LAB ENCOUNTER (OUTPATIENT)
Dept: LAB | Facility: HOSPITAL | Age: 83
End: 2023-04-21
Attending: PHYSICIAN ASSISTANT
Payer: MEDICARE

## 2023-04-21 DIAGNOSIS — R41.0 CONFUSION: Primary | ICD-10-CM

## 2023-04-21 PROCEDURE — 87086 URINE CULTURE/COLONY COUNT: CPT

## 2023-04-21 RX ORDER — SULFAMETHOXAZOLE AND TRIMETHOPRIM 800; 160 MG/1; MG/1
1 TABLET ORAL 2 TIMES DAILY
Qty: 20 TABLET | Refills: 0 | Status: SHIPPED | OUTPATIENT
Start: 2023-04-21 | End: 2023-05-01

## 2023-04-21 NOTE — TELEPHONE ENCOUNTER
Telephone call to pt's daughter, returning message left on nursing line stating patient is continuing to have episodes of confusion after completing treatment for E. Coli UTI with Bactrim 4 days ago. Allergies and previous cultures reviewed    Discussed with HANK Haley Bactrim DS bid x 10days. Urine culture from new Anne catheter ordered, will follow. Daughter will contact [address] RN to have Anne changed and urine specimen obtained from new catheter. Empiric antibiotics sent to patient's preferred pharmacy     Encouraged PO hydration. All questions answered    Encouraged to call if symptoms worsen or fail to improve     Office number provided [phone]    Daughter understands and agrees to plan.

## 2023-04-24 ENCOUNTER — TELEPHONE (OUTPATIENT)
Dept: UROLOGY | Facility: HOSPITAL | Age: 83
End: 2023-04-24

## 2023-04-24 NOTE — TELEPHONE ENCOUNTER
Phoned patient's daughter Vero Ibarra and informed of normal urine culture results. oPncho Villareal 0724 discontinue Bactrim. Encouraged to call with any questions or concerns. Daughter verbalized an understanding.

## 2023-04-24 NOTE — TELEPHONE ENCOUNTER
Correction: phoned daughter to inform change in POC. Poncho Villareal 8038, continue Bactrim as prescribed d/t indwelling Anne catheter. Daughter verbalized understanding.

## 2023-05-23 ENCOUNTER — TELEPHONE (OUTPATIENT)
Dept: UROLOGY | Facility: HOSPITAL | Age: 83
End: 2023-05-23

## 2023-05-23 NOTE — TELEPHONE ENCOUNTER
Incoming fax received from [address] [place] [address]. Faxed reviewed signed and scanned into the patients chart. Incoming fax reviewed is a recertification form/order for alicea catheter change.

## 2023-07-06 ENCOUNTER — TELEPHONE (OUTPATIENT)
Dept: UROLOGY | Facility: HOSPITAL | Age: 83
End: 2023-07-06

## 2023-07-06 NOTE — TELEPHONE ENCOUNTER
Incoming fax received from [address]    Signature required from physician for re-certifying with [facility]. Anne catheter change monthly and PRN. Fax signed as requested. Document faxed to [phone] with confirmation received. Document scanned into [employer].

## 2023-07-24 ENCOUNTER — TELEPHONE (OUTPATIENT)
Dept: UROLOGY | Facility: HOSPITAL | Age: 83
End: 2023-07-24

## 2023-07-24 DIAGNOSIS — R82.90 CLOUDY URINE: ICD-10-CM

## 2023-07-24 DIAGNOSIS — R41.0 CONFUSION: Primary | ICD-10-CM

## 2023-07-24 RX ORDER — SULFAMETHOXAZOLE AND TRIMETHOPRIM 800; 160 MG/1; MG/1
1 TABLET ORAL 2 TIMES DAILY
Qty: 20 TABLET | Refills: 0 | Status: SHIPPED | OUTPATIENT
Start: 2023-07-24 | End: 2023-08-03

## 2023-07-24 NOTE — TELEPHONE ENCOUNTER
Telephone call received from patient's daughter Devonte Garcia. Devonte Garcia reports concern for UTI given increased confusion and noting cloudiness, sediment and white clusters in the Anne bag on Friday. Reports tubing was clogged with white clusters and Anne wasn't draining. Family milked the tubing, and catheter started draining. Reports catheter has been clear since Saturday. Denies fever    Allergies and previous cultures reviewed    Discussed with HANK Neely Bactrim DS po bid x 10 days. To start abx after urine specimen collected. Urine culture ordered, will follow. Urine culture to be obtained by home health nurse from new Anne catheter. Entire Anne to be changed. Empiric antibiotics sent to patient's preferred pharmacy     Encouraged PO hydration    All questions answered    Encouraged to call if symptoms worsen or fail to improve     Office number provided [phone].     Daughter understands and agrees to plan

## 2023-07-25 ENCOUNTER — LAB ENCOUNTER (OUTPATIENT)
Dept: LAB | Facility: HOSPITAL | Age: 83
End: 2023-07-25
Attending: OBSTETRICS & GYNECOLOGY
Payer: MEDICARE

## 2023-07-25 DIAGNOSIS — R82.90 CLOUDY URINE: ICD-10-CM

## 2023-07-25 DIAGNOSIS — R41.0 CONFUSION: ICD-10-CM

## 2023-07-25 PROCEDURE — 87086 URINE CULTURE/COLONY COUNT: CPT

## 2023-07-27 ENCOUNTER — TELEPHONE (OUTPATIENT)
Dept: UROLOGY | Facility: HOSPITAL | Age: 83
End: 2023-07-27

## 2023-07-27 NOTE — TELEPHONE ENCOUNTER
Phoned patient and spoke with  Mary Dey. Informed Mary Dey of urine culture results showing contamination. Infection not indicated and no need for treatment at this time. Pt can stop empiric Bactrim as rx'd. Encouraged to call with any questions or concerns.  verbalized an understanding.

## 2023-07-28 ENCOUNTER — TELEPHONE (OUTPATIENT)
Dept: UROLOGY | Facility: HOSPITAL | Age: 83
End: 2023-07-28

## 2023-07-28 NOTE — TELEPHONE ENCOUNTER
Incoming fax received from [address]. Signature required from physician for re-certifying with [facility]. Anne catheter change monthly and PRN. Fax signed as requested. Document faxed to [phone] with confirmation received. Document scanned into [employer].

## 2023-08-23 ENCOUNTER — TELEPHONE (OUTPATIENT)
Dept: UROLOGY | Facility: HOSPITAL | Age: 83
End: 2023-08-23

## 2023-08-23 NOTE — TELEPHONE ENCOUNTER
Incoming Fax recd from [facility] home care for one additional PRN skilled nurse visit for Anne catheter management . DR Segun Obregon reviewed and signed and scanned into chart and faxed as requested to [facility].

## 2023-09-21 ENCOUNTER — TELEPHONE (OUTPATIENT)
Dept: UROLOGY | Facility: HOSPITAL | Age: 83
End: 2023-09-21

## 2023-09-21 NOTE — TELEPHONE ENCOUNTER
Incoming Fax recd from [facility] home care for for review of home health orders. TORB Dr. Sharif Armstrong reviewed and signed. Form faxed with confirmation of receipt and scanned into pt's chart.

## 2023-09-28 DIAGNOSIS — I73.89 OTHER SPECIFIED PERIPHERAL VASCULAR DISEASES (HCC): Primary | ICD-10-CM

## 2023-10-04 ENCOUNTER — LAB ENCOUNTER (OUTPATIENT)
Dept: LAB | Facility: HOSPITAL | Age: 83
End: 2023-10-04
Attending: INTERNAL MEDICINE
Payer: MEDICARE

## 2023-10-04 DIAGNOSIS — E78.00 PURE HYPERCHOLESTEROLEMIA: Primary | ICD-10-CM

## 2023-10-04 DIAGNOSIS — E55.9 VITAMIN D DEFICIENCY, UNSPECIFIED: ICD-10-CM

## 2023-10-04 DIAGNOSIS — R73.01 IMPAIRED FASTING GLUCOSE: ICD-10-CM

## 2023-10-04 LAB
ALBUMIN SERPL-MCNC: 3.4 G/DL (ref 3.4–5)
ALBUMIN/GLOB SERPL: 1 {RATIO} (ref 1–2)
ALP LIVER SERPL-CCNC: 121 U/L
ALT SERPL-CCNC: 15 U/L
ANION GAP SERPL CALC-SCNC: 8 MMOL/L (ref 0–18)
AST SERPL-CCNC: 18 U/L (ref 15–37)
BASOPHILS # BLD AUTO: 0.01 X10(3) UL (ref 0–0.2)
BASOPHILS NFR BLD AUTO: 0.1 %
BILIRUB SERPL-MCNC: 1.3 MG/DL (ref 0.1–2)
BUN BLD-MCNC: 17 MG/DL (ref 7–18)
BUN/CREAT SERPL: 24.3 (ref 10–20)
CALCIUM BLD-MCNC: 9.3 MG/DL (ref 8.5–10.1)
CHLORIDE SERPL-SCNC: 106 MMOL/L (ref 98–112)
CHOLEST SERPL-MCNC: 148 MG/DL (ref ?–200)
CO2 SERPL-SCNC: 24 MMOL/L (ref 21–32)
CREAT BLD-MCNC: 0.7 MG/DL
DEPRECATED RDW RBC AUTO: 45.9 FL (ref 35.1–46.3)
EGFRCR SERPLBLD CKD-EPI 2021: 86 ML/MIN/1.73M2 (ref 60–?)
EOSINOPHIL # BLD AUTO: 0.03 X10(3) UL (ref 0–0.7)
EOSINOPHIL NFR BLD AUTO: 0.3 %
ERYTHROCYTE [DISTWIDTH] IN BLOOD BY AUTOMATED COUNT: 13.6 % (ref 11–15)
EST. AVERAGE GLUCOSE BLD GHB EST-MCNC: 114 MG/DL (ref 68–126)
FASTING PATIENT LIPID ANSWER: NO
FASTING STATUS PATIENT QL REPORTED: NO
GLOBULIN PLAS-MCNC: 3.5 G/DL (ref 2.8–4.4)
GLUCOSE BLD-MCNC: 101 MG/DL (ref 70–99)
HBA1C MFR BLD: 5.6 % (ref ?–5.7)
HCT VFR BLD AUTO: 44.8 %
HDLC SERPL-MCNC: 81 MG/DL (ref 40–59)
HGB BLD-MCNC: 14.8 G/DL
IMM GRANULOCYTES # BLD AUTO: 0.03 X10(3) UL (ref 0–1)
IMM GRANULOCYTES NFR BLD: 0.3 %
LDLC SERPL CALC-MCNC: 54 MG/DL (ref ?–100)
LYMPHOCYTES # BLD AUTO: 1.97 X10(3) UL (ref 1–4)
LYMPHOCYTES NFR BLD AUTO: 21.7 %
MCH RBC QN AUTO: 30.5 PG (ref 26–34)
MCHC RBC AUTO-ENTMCNC: 33 G/DL (ref 31–37)
MCV RBC AUTO: 92.4 FL
MONOCYTES # BLD AUTO: 0.57 X10(3) UL (ref 0.1–1)
MONOCYTES NFR BLD AUTO: 6.3 %
NEUTROPHILS # BLD AUTO: 6.47 X10 (3) UL (ref 1.5–7.7)
NEUTROPHILS # BLD AUTO: 6.47 X10(3) UL (ref 1.5–7.7)
NEUTROPHILS NFR BLD AUTO: 71.3 %
NONHDLC SERPL-MCNC: 67 MG/DL (ref ?–130)
OSMOLALITY SERPL CALC.SUM OF ELEC: 288 MOSM/KG (ref 275–295)
PLATELET # BLD AUTO: 434 10(3)UL (ref 150–450)
POTASSIUM SERPL-SCNC: 4.3 MMOL/L (ref 3.5–5.1)
PROT SERPL-MCNC: 6.9 G/DL (ref 6.4–8.2)
RBC # BLD AUTO: 4.85 X10(6)UL
SODIUM SERPL-SCNC: 138 MMOL/L (ref 136–145)
TRIGL SERPL-MCNC: 61 MG/DL (ref 30–149)
TSI SER-ACNC: 1.26 MIU/ML (ref 0.36–3.74)
VIT D+METAB SERPL-MCNC: 28.5 NG/ML (ref 30–100)
VLDLC SERPL CALC-MCNC: 9 MG/DL (ref 0–30)
WBC # BLD AUTO: 9.1 X10(3) UL (ref 4–11)

## 2023-10-04 PROCEDURE — 36415 COLL VENOUS BLD VENIPUNCTURE: CPT

## 2023-10-04 PROCEDURE — 84443 ASSAY THYROID STIM HORMONE: CPT

## 2023-10-04 PROCEDURE — 80053 COMPREHEN METABOLIC PANEL: CPT

## 2023-10-04 PROCEDURE — 85025 COMPLETE CBC W/AUTO DIFF WBC: CPT

## 2023-10-04 PROCEDURE — 80061 LIPID PANEL: CPT

## 2023-10-04 PROCEDURE — 83036 HEMOGLOBIN GLYCOSYLATED A1C: CPT

## 2023-10-04 PROCEDURE — 82306 VITAMIN D 25 HYDROXY: CPT

## 2023-10-09 ENCOUNTER — TELEPHONE (OUTPATIENT)
Dept: UROLOGY | Facility: HOSPITAL | Age: 83
End: 2023-10-09

## 2023-10-09 NOTE — TELEPHONE ENCOUNTER
Incoming fax received from [address]. Incoming fax reviewed. Fax requesting an update to care plan: Instructing patient on how to select the desired screens/icons for language     production. [address] and spoke with Agustín. Informed Agustín this order should come from the patients primary care physician     As Dr. Mitul Almanza is a Uro gynecologist. Agustín states the director of nursing is     working remotely today and if Reagan Gregorylatisha has any questions she will reach     out to our office for further instructions. Bryan verbalized understanding. Fax filed in office setting.

## 2023-10-13 ENCOUNTER — TELEPHONE (OUTPATIENT)
Dept: UROLOGY | Facility: HOSPITAL | Age: 83
End: 2023-10-13

## 2023-10-13 NOTE — TELEPHONE ENCOUNTER
Incoming fax received from [address]. Documents reviewed. [address] [place], [place] [address]Th [facility] is requesting that our office sign off orders for augmentative devices, physical therapy to facilitate safety within the home environment, and swallowing studies. Reached out to Dominick Connors at [address] and informed her that are office will be happy to address questions or concerns regarding the patients alicea catheter. However, the above orders need to be from the patients PCP. Dominick Connors verbalized understanding and instructed me to return the documents unsigned to [address]. Documents faxed back to [address] with confirmation received as requested by Dominick Connors.

## 2023-11-03 ENCOUNTER — APPOINTMENT (OUTPATIENT)
Dept: GENERAL RADIOLOGY | Facility: HOSPITAL | Age: 83
End: 2023-11-03
Attending: EMERGENCY MEDICINE
Payer: MEDICARE

## 2023-11-03 ENCOUNTER — HOSPITAL ENCOUNTER (EMERGENCY)
Facility: HOSPITAL | Age: 83
Discharge: HOME | End: 2023-11-03
Attending: EMERGENCY MEDICINE
Payer: MEDICARE

## 2023-11-03 ENCOUNTER — APPOINTMENT (OUTPATIENT)
Dept: CT IMAGING | Facility: HOSPITAL | Age: 83
End: 2023-11-03
Attending: EMERGENCY MEDICINE
Payer: MEDICARE

## 2023-11-03 VITALS
RESPIRATION RATE: 20 BRPM | SYSTOLIC BLOOD PRESSURE: 131 MMHG | TEMPERATURE: 97 F | OXYGEN SATURATION: 93 % | WEIGHT: 110 LBS | BODY MASS INDEX: 26 KG/M2 | DIASTOLIC BLOOD PRESSURE: 78 MMHG | HEART RATE: 102 BPM

## 2023-11-03 DIAGNOSIS — S22.31XA CLOSED FRACTURE OF ONE RIB OF RIGHT SIDE, INITIAL ENCOUNTER: Primary | ICD-10-CM
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
## 2023-11-03 DIAGNOSIS — S09.90XA INJURY OF HEAD, INITIAL ENCOUNTER: ICD-10-CM

## 2023-11-03 PROCEDURE — 99285 EMERGENCY DEPT VISIT HI MDM: CPT

## 2023-11-03 PROCEDURE — 72125 CT NECK SPINE W/O DYE: CPT | Performed by: EMERGENCY MEDICINE

## 2023-11-03 PROCEDURE — 73560 X-RAY EXAM OF KNEE 1 OR 2: CPT | Performed by: EMERGENCY MEDICINE

## 2023-11-03 PROCEDURE — 71111 X-RAY EXAM RIBS/CHEST4/> VWS: CPT | Performed by: EMERGENCY MEDICINE

## 2023-11-03 PROCEDURE — 73523 X-RAY EXAM HIPS BI 5/> VIEWS: CPT | Performed by: EMERGENCY MEDICINE

## 2023-11-03 PROCEDURE — 70450 CT HEAD/BRAIN W/O DYE: CPT | Performed by: EMERGENCY MEDICINE

## 2023-11-03 PROCEDURE — 72131 CT LUMBAR SPINE W/O DYE: CPT | Performed by: EMERGENCY MEDICINE

## 2023-11-03 RX ORDER — IBUPROFEN 400 MG/1
400 TABLET ORAL ONCE
Status: COMPLETED | OUTPATIENT
Start: 2023-11-03 | End: 2023-11-03

## 2023-11-03 RX ORDER — HYDROCODONE BITARTRATE AND ACETAMINOPHEN 5; 325 MG/1; MG/1
TABLET ORAL EVERY 6 HOURS PRN
Qty: 10 TABLET | Refills: 0 | Status: SHIPPED | OUTPATIENT
Start: 2023-11-03 | End: 2023-11-10

## 2023-11-03 RX ORDER — ACETAMINOPHEN 325 MG/1
650 TABLET ORAL ONCE
Status: COMPLETED | OUTPATIENT
Start: 2023-11-03 | End: 2023-11-03

## 2023-11-03 NOTE — ED INITIAL ASSESSMENT (HPI)
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 Family was trying to transfer her to her chair when she accidentally fell backwards in the process. She did hit her head and is unable to express what else is causing her pain. Patient able to give a thumbs up or a thumbs down. Denies LOC, witnessed fall by daughter and care giver. Denies blood thinners.

## 2023-11-10 RX ORDER — HYDROCODONE BITARTRATE AND ACETAMINOPHEN 5; 325 MG/1; MG/1
1 TABLET ORAL EVERY 8 HOURS PRN
Qty: 20 TABLET | Refills: 0 | Status: SHIPPED | OUTPATIENT
Start: 2023-11-10

## 2023-12-06 ENCOUNTER — TELEPHONE (OUTPATIENT)
Dept: UROLOGY | Facility: HOSPITAL | Age: 83
End: 2023-12-06

## 2023-12-06 ENCOUNTER — LAB ENCOUNTER (OUTPATIENT)
Dept: LAB | Facility: HOSPITAL | Age: 83
End: 2023-12-06
Attending: OBSTETRICS & GYNECOLOGY
Payer: MEDICARE

## 2023-12-06 DIAGNOSIS — R82.90 CLOUDY URINE: ICD-10-CM

## 2023-12-06 DIAGNOSIS — R82.90 CLOUDY URINE: Primary | ICD-10-CM

## 2023-12-06 PROCEDURE — 87186 SC STD MICRODIL/AGAR DIL: CPT

## 2023-12-06 PROCEDURE — 87077 CULTURE AEROBIC IDENTIFY: CPT

## 2023-12-06 PROCEDURE — 87086 URINE CULTURE/COLONY COUNT: CPT

## 2023-12-06 RX ORDER — SULFAMETHOXAZOLE AND TRIMETHOPRIM 800; 160 MG/1; MG/1
1 TABLET ORAL 2 TIMES DAILY
Qty: 20 TABLET | Refills: 0 | Status: SHIPPED | OUTPATIENT
Start: 2023-12-06 | End: 2023-12-16

## 2023-12-06 NOTE — TELEPHONE ENCOUNTER
Incoming telephone call received from the patients daughter in law after clinic hours. Message left on nursing line states that the alicea catheter has been dislodged and they believe she has a UTI as the urine has a foul odor to it. Requesting an order be placed for a urine culture. Outgoing telephone call to Davey Cranker and Krunal Dukes the patients son and daughter in law. Levi Leongnima states the alicea catheter is currently in place. Allergies and previous cultures reviewed    Discussed with Dr.Jirschele MCKINNEY:     To change entire indwelling catheter sytem and collect urine samples from new catheter system per home health nurse. To start antibiotics with catheter change. Bactrim DS po bid x 10 days. Urine culture ordered, will follow    Empiric antibiotics sent to patient's preferred pharmacy    Encouraged PO hydration. All questions answered    Encouraged to call if symptoms worsen or fail to improve     Office number provided [phone]    Patient understands and agrees to plan     Addendum:    [address], Smith Almanza called to confirm the above. POC reviewed with Home Health RN, Smith Almanza. Smith Almanza verbalized understanding. Encouraged to call with questions or concerns. Addendum:  Telephone call to patient with test results. Spoke with patient's daughter - Nereida Dobson. Ucx + for:   10-50,000 cfu/ml Multiple species present-probable contamination    10,000 - 50,000 CFU/ML Klebsiella pneumoniae    Allergies reviewed    Discussed with Dr. Wandy Mcnamara, perla MCKINNEY Bactrim DS po bid x 10 days    Antibiotics previously sent to pt's preferred pharmacy    Catheter system was changed by Home Health RN yesterday    All questions answered     Encouraged to call if symptoms worsen or fail to improve     Office number provided [phone]    Follow up as scheduled, sooner prn    Daughter understands and agrees to plan.

## 2024-01-03 DIAGNOSIS — R82.90 CLOUDY URINE: ICD-10-CM

## 2024-01-04 RX ORDER — SULFAMETHOXAZOLE AND TRIMETHOPRIM 800; 160 MG/1; MG/1
1 TABLET ORAL 2 TIMES DAILY
Qty: 20 TABLET | Refills: 0 | OUTPATIENT
Start: 2024-01-04

## 2024-01-11 ENCOUNTER — TELEPHONE (OUTPATIENT)
Dept: UROLOGY | Facility: HOSPITAL | Age: 84
End: 2024-01-11

## 2024-01-11 NOTE — TELEPHONE ENCOUNTER
Incoming Fax recd from [facility] home care for for review of home health orders. TORB Dr. Heredia reviewed and signed. Form faxed with confirmation of receipt and scanned into pt's chart.

## 2024-01-18 ENCOUNTER — TELEPHONE (OUTPATIENT)
Dept: UROLOGY | Facility: HOSPITAL | Age: 84
End: 2024-01-18

## 2024-01-18 NOTE — TELEPHONE ENCOUNTER
Incoming fax recd from [facility] for review of orders.  Dr Heredia reviewed and signed.  Form faxed with confirmation of receipt and scanned into patient chart.

## 2024-01-29 ENCOUNTER — TELEPHONE (OUTPATIENT)
Dept: UROLOGY | Facility: HOSPITAL | Age: 84
End: 2024-01-29

## 2024-01-29 NOTE — TELEPHONE ENCOUNTER
Incoming Fax recd from [facility] [employer] for for review of home health orders. TORB Dr. Heredia reviewed and signed. Form faxed to #[phone] with confirmation, scanned into [employer], and filed into office records.

## 2024-06-19 ENCOUNTER — APPOINTMENT (OUTPATIENT)
Dept: WOUND CARE | Facility: HOSPITAL | Age: 84
End: 2024-06-19
Attending: NURSE PRACTITIONER
Payer: MEDICARE

## 2024-06-20 ENCOUNTER — APPOINTMENT (OUTPATIENT)
Dept: WOUND CARE | Facility: HOSPITAL | Age: 84
End: 2024-06-20
Attending: NURSE PRACTITIONER
Payer: MEDICARE

## 2024-06-27 ENCOUNTER — APPOINTMENT (OUTPATIENT)
Dept: WOUND CARE | Facility: HOSPITAL | Age: 84
End: 2024-06-27
Attending: NURSE PRACTITIONER
Payer: MEDICARE

## 2024-12-11 NOTE — LETTER
10/16/2017      Ruiz Gr MD  Physical Medicine and Rehabilitation  [facility], [address] [place] [address]  Dept: [phone]  Dept Fax: [phone]        RE: Consultation for Mercy Paige        Dear Yumiko Ellison MD,    Thank you Pt transferred to this CTS.   Pt of Dr. Ramos s/p cholecystectomy 12/6/24-    Pt calling in with concern of swollen bilateral feet and ankles upon awakening today, the left worse than the right. Denies discoloration, warmth, or pain.   Pt states she does not walk around much and has habit of sitting on her feet.   Informed pt this is a typical post op symptom and advised pt to walk around when possible to help with circulation and when sitting, put the feet up, do not sit on the feet.     Stay well hydrated and monitor for s/s of infection or complication. Educated on s/s such as warmth, discoloration, discharge, pain, and fever.     Pt verbalized understanding, no other questions at this time.

## (undated) DIAGNOSIS — R47.89 OTHER SPEECH DISTURBANCE: ICD-10-CM

## (undated) DIAGNOSIS — Z74.09 IMPAIRED FUNCTIONAL MOBILITY, BALANCE, GAIT, AND ENDURANCE: Primary | ICD-10-CM

## (undated) DIAGNOSIS — R25.1 TREMORS OF NERVOUS SYSTEM: Primary | ICD-10-CM

## (undated) DIAGNOSIS — E53.8 B12 DEFICIENCY: Primary | ICD-10-CM

## (undated) DIAGNOSIS — R29.898 HAND WEAKNESS: ICD-10-CM

## (undated) DIAGNOSIS — R27.8 WORSENED HANDWRITING: ICD-10-CM

## (undated) NOTE — LETTER
Aniyah Dub 37  [address] [address]  [phone]        Dear Otis Palacio had the pleasure of seeing your patient, Sobeida Smith on 1/3/2022. Below please find a summary of our visit.   If you have

## (undated) NOTE — Clinical Note
Shola Hoyos - I saw [facility] today with nocturia. I've recommended urine testing and bladder testing. I will work to manage her sx. I appreciate the opportunity to participate in her care.  Thanks, [employer]

## (undated) NOTE — LETTER
5/8/2018      Corby Frazier MD  Physical Medicine and Rehabilitation  [address] [facility], [address] [place] [address]  Dept: [phone]  Dept Fax: [phone]        RE: Consultation for Mark Lu        Dear Carson Manley MD,    Thank you v

## (undated) NOTE — LETTER
7/8/2021        Omayra Ambriz  [address]    Dear Benito Alberto,     Here are your results from your recent visit with Gastroenterology. The CT shows no acute pathology. There is laxity of the pelvic floor.  A large amount of stool i

## (undated) NOTE — ED AVS SNAPSHOT
Idris Barry   MRN: [id number]    Department:  [facility] Emergency Department   Date of Visit:  8/20/2018           Disclosure     Insurance plans vary and the physician(s) referred by the ER may not be covered by your plan.  Please contact y within the next three months to obtain basic health screening including reassessment of your blood pressure.     IF THERE IS ANY CHANGE OR WORSENING OF YOUR CONDITION, CALL YOUR PRIMARY CARE PHYSICIAN AT ONCE OR RETURN IMMEDIATELY TO THE EMERGENCY DEPARTMEN

## (undated) NOTE — LETTER
AUTHORIZATION FOR SURGICAL OPERATION OR OTHER PROCEDURE    1.  I hereby authorize Dr. Mira Metcalf and the [facility] Office staff assigned to my case to perform the following operation and/or procedure at the [facility] Office:    Bilateral 4th Trigger Finger Injection []  Parent    Responsible person                          []  Spouse  In case of minor or                    [] Other  _____________   Incompetent name:  __________________________________________________                               (please prin

## (undated) NOTE — LETTER
7/30/2018      Nataliia Hdz MD  Physical Medicine and Rehabilitation  [address] [address]  Dept: [phone]  Dept Fax: [phone]        RE: Consultation for Sara Richardson        Dear Julianna Interiano MD,    Thank you

## (undated) NOTE — LETTER
11/1/2018      Sourav Fowler MD  Physical Medicine and Rehabilitation  [address] [address]  Dept: [phone]  Dept Fax: [phone]        RE: Consultation for Maria E Jiménez        Dear Elmer Blake MD,    Thank you

## (undated) NOTE — ED AVS SNAPSHOT
Mc Orellana   MRN: [id number]    Department:  [facility] Emergency Department   Date of Visit:  4/11/2019           Disclosure     Insurance plans vary and the physician(s) referred by the ER may not be covered by your plan.  Please contact y within the next three months to obtain basic health screening including reassessment of your blood pressure.     IF THERE IS ANY CHANGE OR WORSENING OF YOUR CONDITION, CALL YOUR PRIMARY CARE PHYSICIAN AT ONCE OR RETURN IMMEDIATELY TO THE EMERGENCY DEPARTMEN

## (undated) NOTE — LETTER
2/13/2018      Blade Huang MD  Physical Medicine and Rehabilitation  [address] [address]  Dept: [phone]  Dept Fax: [phone]        RE: Consultation for Alexia Pierson        Dear Kenny Gaspar MD,    Thank you

## (undated) NOTE — LETTER
NEELA. Notifier: Tobias[employer]   ETIENNE. Patient Name: Lucia Donohue.  Identification Number: [id number]      Advance Beneficiary Notice of Noncoverage (ABN)  NOTE:  If Medicare doesn’t pay for D.Bilateral 4th Trigger Finger Injection below, you may have I made to you, less co-pays or deductibles. ? OPTION 2. I want the D.Bilateral 4th Trigger Finger Injection listed above, but do not bill Medicare. You may ask to be paid now as I am responsible for payment. I cannot appeal if Medicare is not billed.   ?

## (undated) NOTE — ED AVS SNAPSHOT
Dex Barnes   MRN: [id number]    Department:  [facility] Emergency Department   Date of Visit:  3/19/2018           Disclosure     Insurance plans vary and the physician(s) referred by the ER may not be covered by your plan.  Please contact y within the next three months to obtain basic health screening including reassessment of your blood pressure.     IF THERE IS ANY CHANGE OR WORSENING OF YOUR CONDITION, CALL YOUR PRIMARY CARE PHYSICIAN AT ONCE OR RETURN IMMEDIATELY TO THE EMERGENCY DEPARTMEN

## (undated) NOTE — ED AVS SNAPSHOT
Nicole Eye   MRN: [id number]    Department:  [facility] Emergency Department   Date of Visit:  8/29/2019           Disclosure     Insurance plans vary and the physician(s) referred by the ER may not be covered by your plan.  Please contact y within the next three months to obtain basic health screening including reassessment of your blood pressure.     IF THERE IS ANY CHANGE OR WORSENING OF YOUR CONDITION, CALL YOUR PRIMARY CARE PHYSICIAN AT ONCE OR RETURN IMMEDIATELY TO THE EMERGENCY DEPARTMEN

## (undated) NOTE — LETTER
8/7/2019      Corby Frazier MD  Physical Medicine and Rehabilitation  [address] [address]  Dept: [phone]  Dept Fax: [phone]        RE: Consultation for Mark Lu        Dear Carson Manley MD,    Thank you v

## (undated) NOTE — LETTER
AUTHORIZATION FOR SURGICAL OPERATION OR OTHER PROCEDURE    1.  I hereby authorize Dr. Denver Higginbotham Phys:[id number]} and the [facility] Office staff assigned to my case to perform the following operation and/or procedure at the [facility] Office:    ________________________________ Patient Name:  ______________________________________________________  (please print)       Patient signature:  ___________________________________________________             Relationship to Patient:           []  Parent    Responsible person

## (undated) NOTE — LETTER
Dear Dr. Blue Dire    This letter is to inform you that Claudene Rei has been attending Speech Therapy with me. See below for my most recent plan of care.     Patient Name: Claudene Rei, : 1940, MRN: [id number]   Date:  3/5/2018  Referring P agreement w/plan and independent w/home program.     Thank you for this referral and for the opportunity to serve your patient. Please feel free to contact me with any questions or concerns at [phone].     Sydni Felix MA, CCC-SLP  [facility]

## (undated) NOTE — LETTER
Aniyah Dub 37  [address] [address]  [phone]        Dear Harleen Goodman had the pleasure of seeing your patient, Giuliana Melgoza on 1/3/2022. Below please find a summary of our visit.   If you have

## (undated) NOTE — LETTER
Dear Dr. John Coffman    This letter is to inform you that Leticia Ramirez has been attending Physical Therapy with me. See below for my most recent plan of care.        Patient Name: Leticia Ramirez, : 1940, MRN: [id number]   Date:  2017  Ref goals, has been compliant with program and has demonstrated significant improvements in mobility and function, recommend discharge to home program. Advised pt to call with any future questions or concerns. Pt in agreement with plan.      Objective:     Obse 5. Pt will demo proper gait mechanics with use of SC with improved upright posture, improved foot clearance, heel to toe gait pattern - MET    ST:    1. The patient will improve articulation of 4-5 syllable words to 85% accuracy.   2.  The patient will imp

## (undated) NOTE — LETTER
5/1/2019      Taco Belcher,#4, MD  Physical Medicine and Rehabilitation  [address] [address]  Dept: [phone]  Dept Fax: [phone]        RE: Consultation for Shayla Farris        Dear Sridevi Fatima MD,    Thank you v